# Patient Record
Sex: FEMALE | Race: WHITE | NOT HISPANIC OR LATINO | Employment: OTHER | URBAN - METROPOLITAN AREA
[De-identification: names, ages, dates, MRNs, and addresses within clinical notes are randomized per-mention and may not be internally consistent; named-entity substitution may affect disease eponyms.]

---

## 2017-01-05 ENCOUNTER — OFFICE VISIT (OUTPATIENT)
Dept: OTHER | Facility: IMAGING CENTER | Age: 77
End: 2017-01-05

## 2017-01-05 DIAGNOSIS — N83.8 OVARIAN MASS: ICD-10-CM

## 2017-01-05 DIAGNOSIS — J45.40 MODERATE PERSISTENT ASTHMA WITHOUT COMPLICATION: Primary | ICD-10-CM

## 2017-01-05 DIAGNOSIS — N81.10 BLADDER PROLAPSE, FEMALE, ACQUIRED: ICD-10-CM

## 2017-01-05 DIAGNOSIS — F41.9 ANXIETY: ICD-10-CM

## 2017-01-05 PROCEDURE — 97810 ACUP 1/> WO ESTIM 1ST 15 MIN: CPT | Performed by: FAMILY MEDICINE

## 2017-01-05 PROCEDURE — 99213 OFFICE O/P EST LOW 20 MIN: CPT | Mod: 25 | Performed by: FAMILY MEDICINE

## 2017-01-05 PROCEDURE — 97811 ACUP 1/> W/O ESTIM EA ADD 15: CPT | Performed by: FAMILY MEDICINE

## 2017-01-05 NOTE — MR AVS SNAPSHOT
Stephanie Guerrero   2017 3:30 PM   Office Visit   MRN: [id number]    Department:  Acupuncture Med   Dept Phone:  [phone]    Description:  Female : 1940   Provider:  Sushant Ferrera M.D.           Allergies as of 2017     Allergen Noted Reactions    Enalapril 2015   Rash           Vital Signs     Smoking Status                   Former Smoker           Basic Information     Date Of Birth Sex Race Ethnicity Preferred Language    1940 Female White Non- English      Problem List              ICD-10-CM Priority Class Noted - Resolved    Poliomyelitis, bulbar A80.30   Unknown - Present    Vitamin D deficiency E55.9   2015 - Present    Bladder prolapse, female, acquired N81.10   2015 - Present    Moderate persistent asthma without complication J45.40   2015 - Present    Anxiety F41.9   2016 - Present    IGT (impaired glucose tolerance) R73.02   10/14/2016 - Present    Ovarian mass N83.9   11/10/2016 - Present      Health Maintenance        Date Due Completion Dates    IMM DTaP/Tdap/Td Vaccine (1 - Tdap) 1959 ---    MAMMOGRAM 1980 ---    IMM INFLUENZA (1) 2016 ---    BONE DENSITY 2017    COLON CANCER SCREENING ANNUAL FIT 10/10/2017 10/10/2016            Current Immunizations     13-VALENT PCV PREVNAR 2016    Pneumococcal polysaccharide vaccine (PPSV-23) 10/11/2010    SHINGLES VACCINE 4/10/2012      Below and/or attached are the medications your provider expects you to take. Review all of your home medications and newly ordered medications with your provider and/or pharmacist. Follow medication instructions as directed by your provider and/or pharmacist. Please keep your medication list with you and share with your provider. Update the information when medications are discontinued, doses are changed, or new medications (including over-the-counter products) are added; and carry medication information at all times in the event of  emergency situations     Allergies:  ENALAPRIL - Rash               Medications  Valid as of: January 05, 2017 -  4:51 PM    Generic Name Brand Name Tablet Size Instructions for use    Albuterol Sulfate (Aero Soln) PROAIR  (90 BASE) MCG/ACT Inhale 2 Puffs by mouth every 6 hours as needed for Shortness of Breath.        Beclomethasone Dipropionate (Aero Soln) QVAR 40 MCG/ACT Inhale 1 Puff by mouth 2 Times a Day.        Cholecalciferol   Take 4,000 Units by mouth every day.        Coenzyme Q10   Take  by mouth.        Fluticasone Propionate (Suspension) FLONASE 50 MCG/ACT Spray 1 Spray in nose 2 times a day.        Loratadine   Take  by mouth.        LORazepam (Tab) ATIVAN 0.5 MG Take 0.5 Tabs by mouth every four hours as needed for Anxiety.        Omega-3 Fatty Acids   Take  by mouth.        Phosphatidylserine (Cap) Phosphatidylserine 100 MG Take  by mouth.        Probiotic Product   Take  by mouth.        Resveratrol (Cap) Resveratrol 250 MG Take  by mouth.        .                 Medicines prescribed today were sent to:     [facility] #[id number] - [place] - [address] [facility]    [address] [place] [address]    Phone: [phone] Fax: [phone]    Open 24 Hours?: No      Medication refill instructions:       If your prescription bottle indicates you have medication refills left, it is not necessary to call your provider’s office. Please contact your pharmacy and they will refill your medication.    If your prescription bottle indicates you do not have any refills left, you may request refills at any time through one of the following ways: The online ALTHIA system (except Urgent Care), by calling your provider’s office, or by asking your pharmacy to contact your provider’s office with a refill request. Medication refills are processed only during regular business hours and may not be available until the next business day. Your provider may request additional information or to have a follow-up visit  with you prior to refilling your medication.   *Please Note: Medication refills are assigned a new Rx number when refilled electronically. Your pharmacy may indicate that no refills were authorized even though a new prescription for the same medication is available at the pharmacy. Please request the medicine by name with the pharmacy before contacting your provider for a refill.        Other Notes About Your Plan     Patient is enrolled in [facility] with Dr. Mally Coles.           MyChart Access Code: Activation code not generated  Current Cree Status: Active

## 2017-01-05 NOTE — PROGRESS NOTES
[facility] Medical Acupuncture Progress Note  [address] [place] [address]  Dept: [phone]      Patient Name: Stephanie Guerrero   MRN: [id number]  YOB: 1940  PCP: Pcp Pt States None  Date of Service: 1/5/2017  3:44 PM    CC  Stephanie - asthma exacerbation, allergic rhinitis, impending surgery   HPI Last treatment at group acupuncture was helpful for her brain, but she was uncomfortable in group acupuncture chairs.  She has come back to private sessions    She has an upcoming surgery for a large ovarian cyst with Dr. Bearden.  She has no pain with this.    She is supposed to get a hysterectomy and cystectomy.  They want to fix the bladder prolapse at the same time.  She's worried about this surgery.  It will be lap.  She has a pre-op January 10th.      She did see the allergist about her breathing.  She is going to have allergy shots to build up her immunity.  She has a Bijon dog.  She's allergic to everything but dogs and feathers.      She did have medications given to her for her previous bladder prolapse surgery.  Tom Meyer.  Deboraadonna 200c Staph 1M, Arnica Montana 1M           ROS She likes to take supplements / vitamins  She's over due for a checkup .  Maintains DM with diet.    Favorite color - aqua - makes her happy.  .  Natural colors.  Summer - likes the sunshine.   Can get too hot for her - past 80s.    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  [place], [place], [place]  2 kids, 2 grandchildren.    Born: [place], [place].  Time -  12:37PM.    Likes to laugh. Likes games.   Lives alone - has dogs.  Did have dogs die on her in 2015  Second hand smoke exposure in the 1970  Had acupuncture in the past in [place], [place] - for an allergy rash  Likes to knit. Used to bowl   PMH Past Medical History   Diagnosis Date   • DM type 2 (diabetes mellitus, type 2) (HCC)      diet controlled   • MEDICAL HOME 4/19/2013   • Impacted ear wax 8/19/2013   • Sinusitis 10/7/2014   •  Bronchitis 10/7/2014   • Bulbar polio      age 16   • Asthma    • Elevated blood pressure 6/5/2015     Has been monitoring blood pressure at home, readings typically 97/70. 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 BP elevated in office today, she suspects this is secondary to office visit and some recent stress and will continue home monitoring. She limits sodium and is physically active.      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 30 Tab 0   • beclomethasone (QVAR) 40 MCG/ACT inhaler Inhale 1 Puff by mouth 2 Times a Day. 1 Inhaler 6   • PROAIR  (90 BASE) MCG/ACT Aero Soln inhalation aerosol Inhale 2 Puffs by mouth every 6 hours as needed for Shortness of Breath. 1 Inhaler 3   • Cholecalciferol (VITAMIN D-3 PO) Take 4,000 Units by mouth every day.     • Resveratrol 250 MG CAPS Take  by mouth.     • Phosphatidylserine 100 MG CAPS Take  by mouth.     • Coenzyme Q10  (CO Q 10 PO) Take  by mouth.     • Probiotic Product (PROBIOTIC PO) Take  by mouth.     • Omega-3 Fatty Acids (OMEGA 3 PO) Take  by mouth.     • fluticasone (FLONASE) 50 MCG/ACT nasal spray Spray 1 Spray in nose 2 times a day. (Patient not taking: Reported on 9/21/2016) 1 Bottle 3   • Loratadine (CLARITIN PO) Take  by mouth.       No current facility-administered medications on file prior to visit.      ALLERGIES Allergies   Allergen Reactions   • Enalapril Rash            PE Pulses - not examined today  Tongue - not examined today     Assesment [place], weak body  Stagnation GEOVANNA/LI Qi, Wood    Western Encounter Diagnoses   Name Primary?   • Moderate persistent asthma without complication Yes   • Anxiety    • Bladder prolapse, female, acquired    • Ovarian mass                   Plan Set 1: LLE Yin - Jue Yin Cesar Chavez 3:6  Set 2: UE Dennis Yin Chavez Darryl 3:6  Set 3: Ear Chávez Men Yin Pascual GV20 Kristel Chávez Spencer  Treatment Matrix - L Yin / R Chavez  Let her borrow a book: How to Prepare for Surgery  Discussed the correct use of Tom Dunn and homeopathic medications.  I will get back to her about the correct homeopathics to take.  Emphasized the use of exercise and good diet for preconditioning and recovery   15 min spent face to face, not including procedure      Sushant Ferrera M.D.

## 2017-01-06 NOTE — PATIENT INSTRUCTIONS
The side effects of Acupuncture needle insertion include: minor bruising, bleeding, or pain at the site of needle insertion.  If more worrisome symptoms, such as continued bleeding, severe bruising, or continue pain or altered sensation persist, please contact [facility] office @ [phone]

## 2017-01-23 ENCOUNTER — HOSPITAL ENCOUNTER (OUTPATIENT)
Dept: RADIOLOGY | Facility: MEDICAL CENTER | Age: 77
End: 2017-01-23
Attending: SPECIALIST | Admitting: SPECIALIST
Payer: MEDICARE

## 2017-01-23 DIAGNOSIS — Z01.811 PRE-OPERATIVE RESPIRATORY EXAMINATION: ICD-10-CM

## 2017-01-23 DIAGNOSIS — Z01.810 PRE-OPERATIVE CARDIOVASCULAR EXAMINATION: ICD-10-CM

## 2017-01-23 DIAGNOSIS — Z01.812 PRE-OPERATIVE LABORATORY EXAMINATION: ICD-10-CM

## 2017-01-23 LAB
ABO GROUP BLD: NORMAL
ALBUMIN SERPL BCP-MCNC: 4.9 G/DL (ref 3.2–4.9)
ALBUMIN/GLOB SERPL: 1.5 G/DL
ALP SERPL-CCNC: 111 U/L (ref 30–99)
ALT SERPL-CCNC: 15 U/L (ref 2–50)
ANION GAP SERPL CALC-SCNC: 6 MMOL/L (ref 0–11.9)
APTT PPP: 28 SEC (ref 24.7–36)
AST SERPL-CCNC: 21 U/L (ref 12–45)
BILIRUB SERPL-MCNC: 0.5 MG/DL (ref 0.1–1.5)
BLD GP AB SCN SERPL QL: NORMAL
BUN SERPL-MCNC: 20 MG/DL (ref 8–22)
CALCIUM SERPL-MCNC: 10.4 MG/DL (ref 8.5–10.5)
CHLORIDE SERPL-SCNC: 98 MMOL/L (ref 96–112)
CO2 SERPL-SCNC: 32 MMOL/L (ref 20–33)
CREAT SERPL-MCNC: 0.84 MG/DL (ref 0.5–1.4)
EKG IMPRESSION: NORMAL
ERYTHROCYTE [DISTWIDTH] IN BLOOD BY AUTOMATED COUNT: 48.3 FL (ref 35.9–50)
GFR SERPL CREATININE-BSD FRML MDRD: >60 ML/MIN/1.73 M 2
GLOBULIN SER CALC-MCNC: 3.3 G/DL (ref 1.9–3.5)
GLUCOSE SERPL-MCNC: 99 MG/DL (ref 65–99)
HCT VFR BLD AUTO: 47.1 % (ref 37–47)
HGB BLD-MCNC: 14.9 G/DL (ref 12–16)
INR PPP: 0.89 (ref 0.87–1.13)
MCH RBC QN AUTO: 30.3 PG (ref 27–33)
MCHC RBC AUTO-ENTMCNC: 31.6 G/DL (ref 33.6–35)
MCV RBC AUTO: 95.7 FL (ref 81.4–97.8)
PLATELET # BLD AUTO: 294 K/UL (ref 164–446)
PMV BLD AUTO: 9.9 FL (ref 9–12.9)
POTASSIUM SERPL-SCNC: 5 MMOL/L (ref 3.6–5.5)
PROT SERPL-MCNC: 8.2 G/DL (ref 6–8.2)
PROTHROMBIN TIME: 12.3 SEC (ref 12–14.6)
RBC # BLD AUTO: 4.92 M/UL (ref 4.2–5.4)
RH BLD: NORMAL
SODIUM SERPL-SCNC: 136 MMOL/L (ref 135–145)
WBC # BLD AUTO: 8.3 K/UL (ref 4.8–10.8)

## 2017-01-23 PROCEDURE — 36415 COLL VENOUS BLD VENIPUNCTURE: CPT

## 2017-01-23 PROCEDURE — 85610 PROTHROMBIN TIME: CPT

## 2017-01-23 PROCEDURE — 86901 BLOOD TYPING SEROLOGIC RH(D): CPT

## 2017-01-23 PROCEDURE — 71020 DX-CHEST-2 VIEWS: CPT

## 2017-01-23 PROCEDURE — 85027 COMPLETE CBC AUTOMATED: CPT

## 2017-01-23 PROCEDURE — 80053 COMPREHEN METABOLIC PANEL: CPT

## 2017-01-23 PROCEDURE — 86900 BLOOD TYPING SEROLOGIC ABO: CPT

## 2017-01-23 PROCEDURE — 86850 RBC ANTIBODY SCREEN: CPT

## 2017-01-23 PROCEDURE — 85730 THROMBOPLASTIN TIME PARTIAL: CPT

## 2017-01-26 ENCOUNTER — HOSPITAL ENCOUNTER (OUTPATIENT)
Facility: MEDICAL CENTER | Age: 77
End: 2017-01-26
Attending: SPECIALIST | Admitting: SPECIALIST
Payer: MEDICARE

## 2017-01-26 VITALS
OXYGEN SATURATION: 93 % | WEIGHT: 119.05 LBS | TEMPERATURE: 97.5 F | HEART RATE: 90 BPM | BODY MASS INDEX: 22.48 KG/M2 | HEIGHT: 61 IN | RESPIRATION RATE: 14 BRPM | SYSTOLIC BLOOD PRESSURE: 139 MMHG | DIASTOLIC BLOOD PRESSURE: 90 MMHG

## 2017-01-26 PROBLEM — N81.10 UNSPECIFIED PROLAPSE OF VAGINAL WALLS: Status: ACTIVE | Noted: 2017-01-26

## 2017-01-26 LAB
ABO GROUP BLD: NORMAL
ANION GAP SERPL CALC-SCNC: 13 MMOL/L (ref 0–11.9)
BASE EXCESS BLDA CALC-SCNC: -10 MMOL/L (ref -4–3)
BASE EXCESS BLDA CALC-SCNC: -6 MMOL/L (ref -4–3)
BODY TEMPERATURE: 36.3 CENTIGRADE
BODY TEMPERATURE: ABNORMAL CENTIGRADE
BUN SERPL-MCNC: 14 MG/DL (ref 8–22)
CALCIUM SERPL-MCNC: 9.8 MG/DL (ref 8.5–10.5)
CHLORIDE SERPL-SCNC: 101 MMOL/L (ref 96–112)
CO2 SERPL-SCNC: 25 MMOL/L (ref 20–33)
CREAT SERPL-MCNC: 0.7 MG/DL (ref 0.5–1.4)
GFR SERPL CREATININE-BSD FRML MDRD: >60 ML/MIN/1.73 M 2
GLUCOSE BLD-MCNC: 107 MG/DL (ref 65–99)
GLUCOSE SERPL-MCNC: 64 MG/DL (ref 65–99)
HCO3 BLDA-SCNC: 20 MMOL/L (ref 17–25)
HCO3 BLDA-SCNC: 22 MMOL/L (ref 17–25)
INHALED O2 FLOW RATE: 5 L/MIN (ref 2–10)
PCO2 BLDA: 41.1 MMHG (ref 26–37)
PCO2 BLDA: 82 MMHG (ref 26–37)
PCO2 TEMP ADJ BLDA: 79.5 MMHG (ref 26–37)
PH BLDA: 7.05 [PH] (ref 7.4–7.5)
PH BLDA: 7.31 [PH] (ref 7.4–7.5)
PH TEMP ADJ BLDA: 7.06 [PH] (ref 7.4–7.5)
PO2 BLDA: 133.8 MMHG (ref 64–87)
PO2 BLDA: 225.8 MMHG (ref 64–87)
PO2 TEMP ADJ BLDA: 222.4 MMHG (ref 64–87)
POTASSIUM SERPL-SCNC: 4.1 MMOL/L (ref 3.6–5.5)
SAO2 % BLDA: 98 % (ref 93–99)
SAO2 % BLDA: 99 % (ref 93–99)
SODIUM SERPL-SCNC: 139 MMOL/L (ref 135–145)

## 2017-01-26 PROCEDURE — 82803 BLOOD GASES ANY COMBINATION: CPT

## 2017-01-26 PROCEDURE — 700111 HCHG RX REV CODE 636 W/ 250 OVERRIDE (IP)

## 2017-01-26 PROCEDURE — 82962 GLUCOSE BLOOD TEST: CPT

## 2017-01-26 PROCEDURE — 160046 HCHG PACU - 1ST 60 MINS PHASE II: Performed by: SPECIALIST

## 2017-01-26 PROCEDURE — 500854 HCHG NEEDLE, INSUFFLATION FOR STEP: Performed by: SPECIALIST

## 2017-01-26 PROCEDURE — 88307 TISSUE EXAM BY PATHOLOGIST: CPT

## 2017-01-26 PROCEDURE — 502714 HCHG ROBOTIC SURGERY SERVICES: Performed by: SPECIALIST

## 2017-01-26 PROCEDURE — 700102 HCHG RX REV CODE 250 W/ 637 OVERRIDE(OP)

## 2017-01-26 PROCEDURE — 160048 HCHG OR STATISTICAL LEVEL 1-5: Performed by: SPECIALIST

## 2017-01-26 PROCEDURE — 500123 HCHG BOVIE, CONTROL W/BLADE: Performed by: SPECIALIST

## 2017-01-26 PROCEDURE — 160002 HCHG RECOVERY MINUTES (STAT): Performed by: SPECIALIST

## 2017-01-26 PROCEDURE — 94640 AIRWAY INHALATION TREATMENT: CPT

## 2017-01-26 PROCEDURE — 88305 TISSUE EXAM BY PATHOLOGIST: CPT

## 2017-01-26 PROCEDURE — 501399 HCHG SPECIMAN BAG, ENDO CATC: Performed by: SPECIALIST

## 2017-01-26 PROCEDURE — 160042 HCHG SURGERY MINUTES - EA ADDL 1 MIN LEVEL 5: Performed by: SPECIALIST

## 2017-01-26 PROCEDURE — 501582 HCHG TROCAR, THRD BLADED: Performed by: SPECIALIST

## 2017-01-26 PROCEDURE — 80048 BASIC METABOLIC PNL TOTAL CA: CPT

## 2017-01-26 PROCEDURE — 700101 HCHG RX REV CODE 250

## 2017-01-26 PROCEDURE — 160035 HCHG PACU - 1ST 60 MINS PHASE I: Performed by: SPECIALIST

## 2017-01-26 PROCEDURE — 501664 HCHG TUBING, FILTER STRYKER: Performed by: SPECIALIST

## 2017-01-26 PROCEDURE — 160009 HCHG ANES TIME/MIN: Performed by: SPECIALIST

## 2017-01-26 PROCEDURE — 160025 RECOVERY II MINUTES (STATS): Performed by: SPECIALIST

## 2017-01-26 PROCEDURE — 160036 HCHG PACU - EA ADDL 30 MINS PHASE I: Performed by: SPECIALIST

## 2017-01-26 PROCEDURE — A4606 OXYGEN PROBE USED W OXIMETER: HCPCS | Performed by: SPECIALIST

## 2017-01-26 PROCEDURE — 88302 TISSUE EXAM BY PATHOLOGIST: CPT

## 2017-01-26 PROCEDURE — 501838 HCHG SUTURE GENERAL: Performed by: SPECIALIST

## 2017-01-26 PROCEDURE — 110382 HCHG SHELL REV 271: Performed by: SPECIALIST

## 2017-01-26 PROCEDURE — 500025 HCHG ARMREST, CRADLE FOAM: Performed by: SPECIALIST

## 2017-01-26 PROCEDURE — 502240 HCHG MISC OR SUPPLY RC 0272: Performed by: SPECIALIST

## 2017-01-26 PROCEDURE — 110371 HCHG SHELL REV 272: Performed by: SPECIALIST

## 2017-01-26 PROCEDURE — 160047 HCHG PACU  - EA ADDL 30 MINS PHASE II: Performed by: SPECIALIST

## 2017-01-26 PROCEDURE — 501411 HCHG SPONGE, BABY LAP W/O RINGS: Performed by: SPECIALIST

## 2017-01-26 PROCEDURE — 160031 HCHG SURGERY MINUTES - 1ST 30 MINS LEVEL 5: Performed by: SPECIALIST

## 2017-01-26 RX ORDER — NALOXONE HYDROCHLORIDE 0.4 MG/ML
INJECTION, SOLUTION INTRAMUSCULAR; INTRAVENOUS; SUBCUTANEOUS
Status: COMPLETED
Start: 2017-01-26 | End: 2017-01-26

## 2017-01-26 RX ORDER — BUPIVACAINE HYDROCHLORIDE AND EPINEPHRINE 2.5; 5 MG/ML; UG/ML
INJECTION, SOLUTION EPIDURAL; INFILTRATION; INTRACAUDAL; PERINEURAL
Status: DISCONTINUED | OUTPATIENT
Start: 2017-01-26 | End: 2017-01-26 | Stop reason: HOSPADM

## 2017-01-26 RX ORDER — MORPHINE SULFATE 4 MG/ML
2 INJECTION, SOLUTION INTRAMUSCULAR; INTRAVENOUS
Status: DISCONTINUED | OUTPATIENT
Start: 2017-01-26 | End: 2017-01-26 | Stop reason: HOSPADM

## 2017-01-26 RX ORDER — SODIUM CHLORIDE, SODIUM LACTATE, POTASSIUM CHLORIDE, CALCIUM CHLORIDE 600; 310; 30; 20 MG/100ML; MG/100ML; MG/100ML; MG/100ML
1000 INJECTION, SOLUTION INTRAVENOUS
Status: COMPLETED | OUTPATIENT
Start: 2017-01-26 | End: 2017-01-26

## 2017-01-26 RX ORDER — MONTELUKAST SODIUM 10 MG/1
10 TABLET ORAL DAILY
COMMUNITY
End: 2018-06-09

## 2017-01-26 RX ORDER — COVID-19 ANTIGEN TEST
1 KIT MISCELLANEOUS
COMMUNITY
End: 2018-06-09

## 2017-01-26 RX ORDER — METOCLOPRAMIDE 10 MG/1
10 TABLET ORAL EVERY 6 HOURS
Status: DISCONTINUED | OUTPATIENT
Start: 2017-01-26 | End: 2017-01-26 | Stop reason: HOSPADM

## 2017-01-26 RX ORDER — LIDOCAINE HYDROCHLORIDE 10 MG/ML
INJECTION, SOLUTION INFILTRATION; PERINEURAL
Status: COMPLETED
Start: 2017-01-26 | End: 2017-01-26

## 2017-01-26 RX ORDER — ONDANSETRON 4 MG/1
4 TABLET, FILM COATED ORAL EVERY 6 HOURS PRN
Qty: 30 TAB | Refills: 0 | Status: SHIPPED | OUTPATIENT
Start: 2017-01-26 | End: 2018-03-14

## 2017-01-26 RX ORDER — ONDANSETRON 2 MG/ML
4 INJECTION INTRAMUSCULAR; INTRAVENOUS EVERY 6 HOURS PRN
Status: CANCELLED | OUTPATIENT
Start: 2017-01-26

## 2017-01-26 RX ORDER — OXYCODONE HYDROCHLORIDE AND ACETAMINOPHEN 5; 325 MG/1; MG/1
1 TABLET ORAL EVERY 4 HOURS PRN
Qty: 60 TAB | Refills: 0 | Status: SHIPPED | OUTPATIENT
Start: 2017-01-26 | End: 2017-02-14

## 2017-01-26 RX ORDER — KETOROLAC TROMETHAMINE 30 MG/ML
15 INJECTION, SOLUTION INTRAMUSCULAR; INTRAVENOUS EVERY 6 HOURS PRN
Status: DISCONTINUED | OUTPATIENT
Start: 2017-01-26 | End: 2017-01-26 | Stop reason: HOSPADM

## 2017-01-26 RX ORDER — MAGNESIUM HYDROXIDE 1200 MG/15ML
LIQUID ORAL
Status: DISCONTINUED | OUTPATIENT
Start: 2017-01-26 | End: 2017-01-26 | Stop reason: HOSPADM

## 2017-01-26 RX ORDER — FAMOTIDINE 20 MG/1
20 TABLET, FILM COATED ORAL 2 TIMES DAILY
Status: CANCELLED | OUTPATIENT
Start: 2017-01-26

## 2017-01-26 RX ORDER — METOCLOPRAMIDE HYDROCHLORIDE 5 MG/ML
10 INJECTION INTRAMUSCULAR; INTRAVENOUS EVERY 6 HOURS
Status: DISCONTINUED | OUTPATIENT
Start: 2017-01-26 | End: 2017-01-26 | Stop reason: HOSPADM

## 2017-01-26 RX ORDER — METOCLOPRAMIDE 10 MG/1
10 TABLET ORAL EVERY 6 HOURS PRN
Qty: 30 TAB | Refills: 0 | Status: SHIPPED | OUTPATIENT
Start: 2017-01-26 | End: 2018-06-09

## 2017-01-26 RX ORDER — OXYCODONE HYDROCHLORIDE AND ACETAMINOPHEN 5; 325 MG/1; MG/1
1 TABLET ORAL EVERY 4 HOURS PRN
Status: DISCONTINUED | OUTPATIENT
Start: 2017-01-26 | End: 2017-01-26 | Stop reason: HOSPADM

## 2017-01-26 RX ORDER — DEXTROSE MONOHYDRATE, SODIUM CHLORIDE, AND POTASSIUM CHLORIDE 50; 1.49; 4.5 G/1000ML; G/1000ML; G/1000ML
INJECTION, SOLUTION INTRAVENOUS CONTINUOUS
Status: CANCELLED | OUTPATIENT
Start: 2017-01-26

## 2017-01-26 RX ORDER — SODIUM CHLORIDE 9 MG/ML
500 INJECTION, SOLUTION INTRAVENOUS PRN
Status: CANCELLED | OUTPATIENT
Start: 2017-01-26

## 2017-01-26 RX ADMIN — LIDOCAINE HYDROCHLORIDE: 10 INJECTION, SOLUTION INFILTRATION; PERINEURAL at 06:55

## 2017-01-26 RX ADMIN — NALOXONE HYDROCHLORIDE 0.1 MG: 0.4 INJECTION, SOLUTION INTRAMUSCULAR; INTRAVENOUS; SUBCUTANEOUS at 12:25

## 2017-01-26 RX ADMIN — SODIUM CHLORIDE, SODIUM LACTATE, POTASSIUM CHLORIDE, CALCIUM CHLORIDE 1000 ML: 600; 310; 30; 20 INJECTION, SOLUTION INTRAVENOUS at 07:05

## 2017-01-26 RX ADMIN — NALOXONE HYDROCHLORIDE 0.1 MG: 0.4 INJECTION, SOLUTION INTRAMUSCULAR; INTRAVENOUS; SUBCUTANEOUS at 12:15

## 2017-01-26 ASSESSMENT — PAIN SCALES - GENERAL
PAINLEVEL_OUTOF10: 0
PAINLEVEL_OUTOF10: 1
PAINLEVEL_OUTOF10: 0
PAINLEVEL_OUTOF10: 1
PAINLEVEL_OUTOF10: 0
PAINLEVEL_OUTOF10: 1
PAINLEVEL_OUTOF10: 0
PAINLEVEL_OUTOF10: 1
PAINLEVEL_OUTOF10: 0

## 2017-01-26 ASSESSMENT — COPD QUESTIONNAIRES
COPD SCREENING SCORE: 3
DO YOU EVER COUGH UP ANY MUCUS OR PHLEGM?: YES, A FEW DAYS A WEEK OR MONTH
DURING THE PAST 4 WEEKS HOW MUCH DID YOU FEEL SHORT OF BREATH: NONE/LITTLE OF THE TIME
HAVE YOU SMOKED AT LEAST 100 CIGARETTES IN YOUR ENTIRE LIFE: NO/DON'T KNOW

## 2017-01-26 ASSESSMENT — LIFESTYLE VARIABLES: EVER_SMOKED: NEVER

## 2017-01-26 NOTE — IP AVS SNAPSHOT
1/26/2017          Stephanie Guerrero  [address]  [place] [address]    Dear Stephanie:    [facility] wants to ensure your discharge home is safe and you or your loved ones have had all your questions answered regarding your care after you leave the hospital.    You may receive a telephone call within two days of your discharge.  This call is to make certain you understand your discharge instructions as well as ensure we provided you with the best care possible during your stay with us.     The call will only last approximately 3-5 minutes and will be done by a nurse.    Once again, we want to ensure your discharge home is safe and that you have a clear understanding of any next steps in your care.  If you have any questions or concerns, please do not hesitate to contact us, we are here for you.  Thank you for choosing [facility] for your healthcare needs.    Sincerely,    Samy Dutton    [facility]

## 2017-01-26 NOTE — PROGRESS NOTES
1135- pt brought to PACU at 1049.  Unable to wake pt up, pt not responding to painful stimuli.  Dr. Gonzalez notified, orders to get ABG, and call with results.     Lab called with critical value:  Ph 7.06, PCO2 79.5.  Dr. Gonzalez notified.  Orders to repeat Narcan and get new lab for ABG in 30 min. Pt able to open eyes and squeeze fingers on command, but then falls right back asleep.   On 5L mask with sats 98%,  VSS.    1243-  Pt awake and following commands.  Denies pain.  VSS.  Second ABG just drawn.

## 2017-01-26 NOTE — OR SURGEON
Immediate Post-Operative Note      PreOp Diagnosis: Robotic BSO, right ureterolysis, GRADE 3 CYSTOCELE AND GRADE 2 RECTOCELE    PostOp Diagnosis: SAME    Procedure(s):   ROBOTIC XI BSO, RIGHT URETEROLYSIS  - Wound Class: Clean Contaminated  ANTERIOR REPAIR FOR ANTERIOR COLPORRHAPHY, POSTERIOR REPAIR - Wound Class: Clean Contaminated    Surgeon(s):  Liborio Bearden M.D.    Anesthesiologist/Type of Anesthesia:  Anesthesiologist: Dane Gonzalez D.O./General    Surgical Staff:  Assistant: JOSH Alfredo  Circulator: Terry Palencia R.N.; Sofia Paez R.N.  Scrub Person: Marah Lake; Anna Son; Ginette Ayala    Specimen: ovaries    Estimated Blood Loss: 50 cc    Findings: enlarged right cystic ovary benign, previous abdominal sacral colpopexy  Mesh is noted, grade 3 cystocele and grade 2 rectocele    Complications: none        1/26/2017 3:47 PM Liborio Bearden

## 2017-01-26 NOTE — IP AVS SNAPSHOT
" After Visit Summary                                                                                                                Name:Stephanie Guerrero  Medical Record Number:[id number]  CSN: [id number]    YOB: 1940   Age: 76 y.o.  Sex: female  HT:1.549 m (5' 1\") WT: 54 kg (119 lb 0.8 oz)          Admit Date: 2017     Discharge Date:   Today's Date: 2017  Attending Doctor:  Liborio Bearden M.D.                  Allergies:  Enalapril                Discharge Instructions         ACTIVITY: Rest and take it easy for the first 24 hours.  A responsible adult is recommended to remain with you during that time.  It is normal to feel sleepy.  We encourage you to not do anything that requires balance, judgment or coordination.    MILD FLU-LIKE SYMPTOMS ARE NORMAL. YOU MAY EXPERIENCE GENERALIZED MUSCLE ACHES, THROAT IRRITATION, HEADACHE AND/OR SOME NAUSEA.    FOR 24 HOURS DO NOT:  Drive, operate machinery or run household appliances.  Drink beer or alcoholic beverages.   Make important decisions or sign legal documents.    SPECIAL INSTRUCTIONS: See Below.    DIET: To avoid nausea, slowly advance diet as tolerated, avoiding spicy or greasy foods for the first day.  Add more substantial food to your diet according to your physician's instructions.  Babies can be fed formula or breast milk as soon as they are hungry.  INCREASE FLUIDS AND FIBER TO AVOID CONSTIPATION.    SURGICAL DRESSING/BATHIN. No heavy lifting greater than 10 pounds for minimum 6 weeks  2. Nothing in vagina (ie no tampons, douching, intercourse) for minimum of 6 weeks  3. No driving while taking narcotics   4. Return to our office as directed and call to confirm appointment  Call our office [phone] if you develop any fevers, chills, nausea/vomiting, heavy vaginal bleeding, or redness, tenderness, and/or drainage from your wound, if you have persistent watery discharge while ambulating or stool draining from the vagina .  5. " Showering is ok after shower make sure wound is dry.   6. You may keep the wound dressing and change everyday. After 2 weeks from surgery you may keep the wound dressing off.   7. You may have vaginal spotting or light bleeding which is normal.  8. If you have not had a bowel movement for 2 days, please take over the counter Milk of Magnesium, 1 tablespoon every 4 hours. After 4 doses and if you still have not had a bowel movement, please call your doctor.   9. You may eat soft diet, such as soup, liquid, for day #1 and if tolerating you may resume your regular diet.  10. Pt to d/c home with Painting catheter and call to get catheter removal on Tuesday 1/31      Painting Catheter Care, Adult  A Painting catheter is a soft, flexible tube. This tube is placed into your bladder to drain pee (urine). If you go home with this catheter in place, follow the instructions below.  TAKING CARE OF THE CATHETER  1. Wash your hands with soap and water.  2. Put soap and water on a clean washcloth.  ¨ Clean the skin where the tube goes into your body.  § Clean away from the tube site.  § Never wipe toward the tube.  § Clean the area using a circular motion.  ¨ Remove all the soap. Pat the area dry with a clean towel. For males, reposition the skin that covers the end of the penis (foreskin).  3. Attach the tube to your leg with tape or a leg strap. Do not stretch the tube tight. If you are using tape, remove any stickiness left behind by past tape you used.  4. Keep the drainage bag below your hips. Keep it off the floor.  5. Check your tube during the day. Make sure it is working and draining. Make sure the tube does not curl, twist, or bend.  6. Do not pull on the tube or try to take it out.  TAKING CARE OF THE DRAINAGE BAGS  You will have a large overnight drainage bag and a small leg bag. You may wear the overnight bag any time. Never wear the small bag at night. Follow the directions below.  Emptying the Drainage Bag  Empty your  drainage bag when it is  -½ full or at least 2-3 times a day.  1. Wash your hands with soap and water.  2. Keep the drainage bag below your hips.  3. Hold the dirty bag over the toilet or clean container.  4. Open the pour spout at the bottom of the bag. Empty the pee into the toilet or container. Do not let the pour spout touch anything.  5. Clean the pour spout with a gauze pad or cotton ball that has rubbing alcohol on it.  6. Close the pour spout.  7. Attach the bag to your leg with tape or a leg strap.  8. Wash your hands well.  Changing the Drainage Bag  Change your bag once a month or sooner if it starts to smell or look dirty.   1. Wash your hands with soap and water.  2. Pinch the rubber tube so that pee does not spill out.  3. Disconnect the catheter tube from the drainage tube at the connection valve. Do not let the tubes touch anything.  4. Clean the end of the catheter tube with an alcohol wipe. Clean the end of a the drainage tube with a different alcohol wipe.  5. Connect the catheter tube to the drainage tube of the clean drainage bag.  6. Attach the new bag to the leg with tape or a leg strap. Avoid attaching the new bag too tightly.  7. Wash your hands well.  Cleaning the Drainage Bag  1. Wash your hands with soap and water.  2. Wash the bag in warm, soapy water.  3. Rinse the bag with warm water.  4. Fill the bag with a mixture of white vinegar and water (1 cup vinegar to 1 quart warm water [.2 liter vinegar to 1 liter warm water]). Close the bag and soak it for 30 minutes in the solution.  5. Rinse the bag with warm water.  6. Hang the bag to dry with the pour spout open and hanging downward.  7. Store the clean bag (once it is dry) in a clean plastic bag.  8. Wash your hands well.  PREVENT INFECTION  · Wash your hands before and after touching your tube.  · Take showers every day. Wash the skin where the tube enters your body. Do not take baths. Replace wet leg straps with dry ones, if this  applies.  · Do not use powders, sprays, or lotions on the genital area. Only use creams, lotions, or ointments as told by your doctor.  · For females, wipe from front to back after going to the bathroom.  · Drink enough fluids to keep your pee clear or pale yellow unless you are told not to have too much fluid (fluid restriction).  · Do not let the drainage bag or tubing touch or lie on the floor.  · Wear cotton underwear to keep the area dry.  GET HELP IF:  · Your pee is cloudy or smells unusually bad.  · Your tube becomes clogged.  · You are not draining pee into the bag or your bladder feels full.  · Your tube starts to leak.  GET HELP RIGHT AWAY IF:  · You have pain, puffiness (swelling), redness, or yellowish-white fluid (pus) where the tube enters the body.  · You have pain in the belly (abdomen), legs, lower back, or bladder.  · You have a fever.  · You see blood fill the tube, or your pee is pink or red.  · You feel sick to your stomach (nauseous), throw up (vomit), or have chills.  · Your tube gets pulled out.  MAKE SURE YOU:   · Understand these instructions.  · Will watch your condition.  · Will get help right away if you are not doing well or get worse.     This information is not intended to replace advice given to you by your health care provider. Make sure you discuss any questions you have with your health care provider.     Document Released: 04/14/2014 Document Revised: 01/08/2016 Document Reviewed: 04/14/2014  [employer] Interactive Patient Education ©2016 [employer]        FOLLOW-UP APPOINTMENT:  A follow-up appointment should be arranged with your doctor on 01/31/2017 for catheter removal, call to schedule.    You should CALL YOUR PHYSICIAN if you develop:  Fever greater than 101 degrees F.  Pain not relieved by medication, or persistent nausea or vomiting.  Excessive bleeding (blood soaking through dressing) or unexpected drainage from the wound.  Extreme redness or swelling around the  incision site, drainage of pus or foul smelling drainage.  Inability to urinate or empty your bladder within 8 hours.  Problems with breathing or chest pain.    You should call 911 if you develop problems with breathing or chest pain.  If you are unable to contact your doctor or surgical center, you should go to the nearest emergency room or urgent care center.  Physician's telephone #: [phone].    If any questions arise, call your doctor.  If your doctor is not available, please feel free to call the Surgical Center at [phone].  The Center is open Monday through Friday from 7AM to 7PM.  You can also call the HEALTH HOTLINE open 24 hours/day, 7 days/week and speak to a nurse at [phone], or toll free at [phone].    A registered nurse may call you a few days after your surgery to see how you are doing after your procedure.    MEDICATIONS: Resume taking daily medication.  Take prescribed pain medication with food.  If no medication is prescribed, you may take non-aspirin pain medication if needed.  PAIN MEDICATION CAN BE VERY CONSTIPATING.  Take a stool softener or laxative such as senokot, pericolace, or milk of magnesia if needed.    Prescription given for Percocet, zofran, reglan. No oral pain medications given.     If your physician has prescribed pain medication that includes Acetaminophen (Tylenol), do not take additional Acetaminophen (Tylenol) while taking the prescribed medication.    Depression / Suicide Risk    As you are discharged from this [facility] facility, it is important to learn how to keep safe from harming yourself.    Recognize the warning signs:  · Abrupt changes in personality, positive or negative- including increase in energy   · Giving away possessions  · Change in eating patterns- significant weight changes-  positive or negative  · Change in sleeping patterns- unable to sleep or sleeping all the time   · Unwillingness or inability to  communicate  · Depression  · Unusual sadness, discouragement and loneliness  · Talk of wanting to die  · Neglect of personal appearance   · Rebelliousness- reckless behavior  · Withdrawal from people/activities they love  · Confusion- inability to concentrate     If you or a loved one observes any of these behaviors or has concerns about self-harm, here's what you can do:  · Talk about it- your feelings and reasons for harming yourself  · Remove any means that you might use to hurt yourself (examples: pills, rope, extension cords, firearm)  · Get professional help from the community (Mental Health, Substance Abuse, psychological counseling)  · Do not be alone:Call your Safe Contact- someone whom you trust who will be there for you.  · Call your local CRISIS HOTLINE 293-4535 or 735-360-1503  · Call your local Children's Mobile Crisis Response Team [place] (486) 339-5045 or www.MobiWork  · Call the toll free National Suicide Prevention Hotlines   · National Suicide Prevention Lifeline 509-044-DBIQ (1559)  · National Hope Line Network 800-SUICIDE (393-5673)       Medication List      START taking these medications        Instructions    metoclopramide 10 MG Tabs   Commonly known as:  REGLAN    Take 1 Tab by mouth every 6 hours as needed.   Dose:  10 mg       ondansetron 4 MG Tabs tablet   Commonly known as:  ZOFRAN    Take 1 Tab by mouth every 6 hours as needed.   Dose:  4 mg       oxycodone-acetaminophen 5-325 MG Tabs   Commonly known as:  PERCOCET    Take 1 Tab by mouth every four hours as needed.   Dose:  1 Tab         CONTINUE taking these medications        Instructions    ALEVE 220 MG Caps   Generic drug:  Naproxen Sodium    Take 1 Cap by mouth 1 time daily as needed.   Dose:  1 Cap       Chromium 1 MG Caps    Take 1 Cap by mouth every day.   Dose:  1 Cap       CLARITIN PO    Take 1 Tab by mouth every day.   Dose:  1 Tab       CO Q 10 PO    Take 1 Tab by mouth every day.   Dose:  1 Tab        ECHINACEA GOLDENSEAL PLUS PO    Take 1 Each by mouth 1 time daily as needed. tincture   Dose:  1 Each       L-GLUTAMINE PO    Take 1 Tab by mouth every day.   Dose:  1 Tab       montelukast 10 MG Tabs   Commonly known as:  SINGULAIR    Take 10 mg by mouth every day.   Dose:  10 mg       multivitamin Tabs    Take 1 Tab by mouth every day.   Dose:  1 Tab       OMEGA 3 PO    Take 1 Tab by mouth every day.   Dose:  1 Tab       GIAN D ARCO PO    Take 1 Each by mouth 3 times a day. Tincture   Dose:  1 Each       Potassium 99 MG Tabs    Take 1 Tab by mouth every day.   Dose:  1 Tab       PROAIR  (90 BASE) MCG/ACT Aers inhalation aerosol   Generic drug:  albuterol    Inhale 2 Puffs by mouth every 6 hours as needed for Shortness of Breath.   Dose:  2 Puff       PROBIOTIC PO    Take 1 Tab by mouth every day.   Dose:  1 Tab       VITAMIN D-3 PO    Take 4,000 Units by mouth every day.   Dose:  4000 Units               Medication Information     Above and/or attached are the medications your physician expects you to take upon discharge. Review all of your home medications and newly ordered medications with your doctor and/or pharmacist. Follow medication instructions as directed by your doctor and/or pharmacist. Please keep your medication list with you and share with your physician. Update the information when medications are discontinued, doses are changed, or new medications (including over-the-counter products) are added; and carry medication information at all times in the event of emergency situations.        Resources     Quit Smoking / Tobacco Use:    I understand the use of any tobacco products increases my chance of suffering from future heart disease or stroke and could cause other illnesses which may shorten my life. Quitting the use of tobacco products is the single most important thing I can do to improve my health. For further information on smoking / tobacco cessation call a Toll Free Quit Line at  1-253.310.1367 (*National [employer] [place]) or 1-456.774.2763 (American Lung Association) or you can access the web based program at www.lungusa.org.    [place] Tobacco Users Help Line:  (418) 173-5690       Toll Free: 5-009-316-8619  Quit Tobacco Program [facility] (112)663-5867    Crisis Hotline:    [place] Crisis Hotline:  4-689-UEULNTX or 1-640.223.1193    [place] Crisis Hotline:    1-649.324.2597 or 043-874-5155    Discharge Survey:   Thank you for choosing [facility]. We hope we did everything we could to make your hospital stay a pleasant one. You may be receiving a survey and we would appreciate your time and participation in answering the questions. Your input is very valuable to us in our efforts to improve our service to our patients and their families.            Signatures     My signature on this form indicates that:    1. I acknowledge receipt and understanding of these Home Care Instruction.  2. My questions regarding this information have been answered to my satisfaction.  3. I have formulated a plan with my discharge nurse to obtain my prescribed medications for home.    __________________________________      __________________________________                   Patient Signature                                 Guardian/Responsible Adult Signature      __________________________________                 __________       ________                       Nurse Signature                                               Date                 Time

## 2017-01-26 NOTE — DISCHARGE INSTRUCTIONS
ACTIVITY: Rest and take it easy for the first 24 hours.  A responsible adult is recommended to remain with you during that time.  It is normal to feel sleepy.  We encourage you to not do anything that requires balance, judgment or coordination.    MILD FLU-LIKE SYMPTOMS ARE NORMAL. YOU MAY EXPERIENCE GENERALIZED MUSCLE ACHES, THROAT IRRITATION, HEADACHE AND/OR SOME NAUSEA.    FOR 24 HOURS DO NOT:  Drive, operate machinery or run household appliances.  Drink beer or alcoholic beverages.   Make important decisions or sign legal documents.    SPECIAL INSTRUCTIONS: See Below.    DIET: To avoid nausea, slowly advance diet as tolerated, avoiding spicy or greasy foods for the first day.  Add more substantial food to your diet according to your physician's instructions.  Babies can be fed formula or breast milk as soon as they are hungry.  INCREASE FLUIDS AND FIBER TO AVOID CONSTIPATION.    SURGICAL DRESSING/BATHIN. No heavy lifting greater than 10 pounds for minimum 6 weeks  2. Nothing in vagina (ie no tampons, douching, intercourse) for minimum of 6 weeks  3. No driving while taking narcotics   4. Return to our office as directed and call to confirm appointment  Call our office [phone] if you develop any fevers, chills, nausea/vomiting, heavy vaginal bleeding, or redness, tenderness, and/or drainage from your wound, if you have persistent watery discharge while ambulating or stool draining from the vagina .  5. Showering is ok after shower make sure wound is dry.   6. You may keep the wound dressing and change everyday. After 2 weeks from surgery you may keep the wound dressing off.   7. You may have vaginal spotting or light bleeding which is normal.  8. If you have not had a bowel movement for 2 days, please take over the counter Milk of Magnesium, 1 tablespoon every 4 hours. After 4 doses and if you still have not had a bowel movement, please call your doctor.   9. You may eat soft diet, such as soup,  liquid, for day #1 and if tolerating you may resume your regular diet.  10. Pt to d/c home with Painting catheter and call to get catheter removal on Tuesday 1/31      Painting Catheter Care, Adult  A Painting catheter is a soft, flexible tube. This tube is placed into your bladder to drain pee (urine). If you go home with this catheter in place, follow the instructions below.  TAKING CARE OF THE CATHETER  1. Wash your hands with soap and water.  2. Put soap and water on a clean washcloth.  ¨ Clean the skin where the tube goes into your body.  § Clean away from the tube site.  § Never wipe toward the tube.  § Clean the area using a circular motion.  ¨ Remove all the soap. Pat the area dry with a clean towel. For males, reposition the skin that covers the end of the penis (foreskin).  3. Attach the tube to your leg with tape or a leg strap. Do not stretch the tube tight. If you are using tape, remove any stickiness left behind by past tape you used.  4. Keep the drainage bag below your hips. Keep it off the floor.  5. Check your tube during the day. Make sure it is working and draining. Make sure the tube does not curl, twist, or bend.  6. Do not pull on the tube or try to take it out.  TAKING CARE OF THE DRAINAGE BAGS  You will have a large overnight drainage bag and a small leg bag. You may wear the overnight bag any time. Never wear the small bag at night. Follow the directions below.  Emptying the Drainage Bag  Empty your drainage bag when it is  -½ full or at least 2-3 times a day.  1. Wash your hands with soap and water.  2. Keep the drainage bag below your hips.  3. Hold the dirty bag over the toilet or clean container.  4. Open the pour spout at the bottom of the bag. Empty the pee into the toilet or container. Do not let the pour spout touch anything.  5. Clean the pour spout with a gauze pad or cotton ball that has rubbing alcohol on it.  6. Close the pour spout.  7. Attach the bag to your leg with tape or a leg  strap.  8. Wash your hands well.  Changing the Drainage Bag  Change your bag once a month or sooner if it starts to smell or look dirty.   1. Wash your hands with soap and water.  2. Pinch the rubber tube so that pee does not spill out.  3. Disconnect the catheter tube from the drainage tube at the connection valve. Do not let the tubes touch anything.  4. Clean the end of the catheter tube with an alcohol wipe. Clean the end of a the drainage tube with a different alcohol wipe.  5. Connect the catheter tube to the drainage tube of the clean drainage bag.  6. Attach the new bag to the leg with tape or a leg strap. Avoid attaching the new bag too tightly.  7. Wash your hands well.  Cleaning the Drainage Bag  1. Wash your hands with soap and water.  2. Wash the bag in warm, soapy water.  3. Rinse the bag with warm water.  4. Fill the bag with a mixture of white vinegar and water (1 cup vinegar to 1 quart warm water [.2 liter vinegar to 1 liter warm water]). Close the bag and soak it for 30 minutes in the solution.  5. Rinse the bag with warm water.  6. Hang the bag to dry with the pour spout open and hanging downward.  7. Store the clean bag (once it is dry) in a clean plastic bag.  8. Wash your hands well.  PREVENT INFECTION  · Wash your hands before and after touching your tube.  · Take showers every day. Wash the skin where the tube enters your body. Do not take baths. Replace wet leg straps with dry ones, if this applies.  · Do not use powders, sprays, or lotions on the genital area. Only use creams, lotions, or ointments as told by your doctor.  · For females, wipe from front to back after going to the bathroom.  · Drink enough fluids to keep your pee clear or pale yellow unless you are told not to have too much fluid (fluid restriction).  · Do not let the drainage bag or tubing touch or lie on the floor.  · Wear cotton underwear to keep the area dry.  GET HELP IF:  · Your pee is cloudy or smells unusually  bad.  · Your tube becomes clogged.  · You are not draining pee into the bag or your bladder feels full.  · Your tube starts to leak.  GET HELP RIGHT AWAY IF:  · You have pain, puffiness (swelling), redness, or yellowish-white fluid (pus) where the tube enters the body.  · You have pain in the belly (abdomen), legs, lower back, or bladder.  · You have a fever.  · You see blood fill the tube, or your pee is pink or red.  · You feel sick to your stomach (nauseous), throw up (vomit), or have chills.  · Your tube gets pulled out.  MAKE SURE YOU:   · Understand these instructions.  · Will watch your condition.  · Will get help right away if you are not doing well or get worse.     This information is not intended to replace advice given to you by your health care provider. Make sure you discuss any questions you have with your health care provider.     Document Released: 04/14/2014 Document Revised: 01/08/2016 Document Reviewed: 04/14/2014  [employer] Interactive Patient Education ©2016 [employer]        FOLLOW-UP APPOINTMENT:  A follow-up appointment should be arranged with your doctor on 01/31/2017 for catheter removal, call to schedule.    You should CALL YOUR PHYSICIAN if you develop:  Fever greater than 101 degrees F.  Pain not relieved by medication, or persistent nausea or vomiting.  Excessive bleeding (blood soaking through dressing) or unexpected drainage from the wound.  Extreme redness or swelling around the incision site, drainage of pus or foul smelling drainage.  Inability to urinate or empty your bladder within 8 hours.  Problems with breathing or chest pain.    You should call 911 if you develop problems with breathing or chest pain.  If you are unable to contact your doctor or surgical center, you should go to the nearest emergency room or urgent care center.  Physician's telephone #: [phone].    If any questions arise, call your doctor.  If your doctor is not available, please feel free to call the  Surgical Center at [phone].  The Center is open Monday through Friday from 7AM to 7PM.  You can also call the HEALTH HOTLINE open 24 hours/day, 7 days/week and speak to a nurse at [phone], or toll free at [phone].    A registered nurse may call you a few days after your surgery to see how you are doing after your procedure.    MEDICATIONS: Resume taking daily medication.  Take prescribed pain medication with food.  If no medication is prescribed, you may take non-aspirin pain medication if needed.  PAIN MEDICATION CAN BE VERY CONSTIPATING.  Take a stool softener or laxative such as senokot, pericolace, or milk of magnesia if needed.    Prescription given for Percocet, zofran, reglan. No oral pain medications given.     If your physician has prescribed pain medication that includes Acetaminophen (Tylenol), do not take additional Acetaminophen (Tylenol) while taking the prescribed medication.    Depression / Suicide Risk    As you are discharged from this [facility] facility, it is important to learn how to keep safe from harming yourself.    Recognize the warning signs:  · Abrupt changes in personality, positive or negative- including increase in energy   · Giving away possessions  · Change in eating patterns- significant weight changes-  positive or negative  · Change in sleeping patterns- unable to sleep or sleeping all the time   · Unwillingness or inability to communicate  · Depression  · Unusual sadness, discouragement and loneliness  · Talk of wanting to die  · Neglect of personal appearance   · Rebelliousness- reckless behavior  · Withdrawal from people/activities they love  · Confusion- inability to concentrate     If you or a loved one observes any of these behaviors or has concerns about self-harm, here's what you can do:  · Talk about it- your feelings and reasons for harming yourself  · Remove any means that you might use to hurt yourself (examples: pills, rope, extension cords,  firearm)  · Get professional help from the community (Mental Health, Substance Abuse, psychological counseling)  · Do not be alone:Call your Safe Contact- someone whom you trust who will be there for you.  · Call your local CRISIS HOTLINE 582-8973 or 712-827-7371  · Call your local Children's Mobile Crisis Response Team [place] (885) 104-3345 or www.Raise  · Call the toll free National Suicide Prevention Hotlines   · National Suicide Prevention Lifeline 597-084-IVPY (6452)  · National Hope Line Network 800-SUICIDE (161-5946)

## 2017-01-27 ENCOUNTER — HOSPITAL ENCOUNTER (INPATIENT)
Facility: MEDICAL CENTER | Age: 77
LOS: 3 days | DRG: 205 | End: 2017-02-02
Attending: EMERGENCY MEDICINE | Admitting: OBSTETRICS & GYNECOLOGY
Payer: MEDICARE

## 2017-01-27 ENCOUNTER — APPOINTMENT (OUTPATIENT)
Dept: RADIOLOGY | Facility: MEDICAL CENTER | Age: 77
DRG: 205 | End: 2017-01-27
Attending: SPECIALIST
Payer: MEDICARE

## 2017-01-27 ENCOUNTER — RESOLUTE PROFESSIONAL BILLING HOSPITAL PROF FEE (OUTPATIENT)
Dept: HOSPITALIST | Facility: MEDICAL CENTER | Age: 77
End: 2017-01-27
Payer: MEDICARE

## 2017-01-27 ENCOUNTER — RESOLUTE PROFESSIONAL BILLING HOSPITAL PROF FEE (OUTPATIENT)
Dept: CARDIOLOGY | Facility: MEDICAL CENTER | Age: 77
End: 2017-01-27
Payer: MEDICARE

## 2017-01-27 DIAGNOSIS — E87.1 HYPONATREMIA: ICD-10-CM

## 2017-01-27 DIAGNOSIS — R11.0 NAUSEA: ICD-10-CM

## 2017-01-27 DIAGNOSIS — J02.9 SORE THROAT: ICD-10-CM

## 2017-01-27 DIAGNOSIS — R53.81 MALAISE: ICD-10-CM

## 2017-01-27 DIAGNOSIS — D72.829 LEUKOCYTOSIS, UNSPECIFIED TYPE: ICD-10-CM

## 2017-01-27 PROBLEM — R06.00 DYSPNEA: Status: ACTIVE | Noted: 2017-01-27

## 2017-01-27 LAB
ANION GAP SERPL CALC-SCNC: 11 MMOL/L (ref 0–11.9)
BASOPHILS # BLD AUTO: 0.2 % (ref 0–1.8)
BASOPHILS # BLD: 0.04 K/UL (ref 0–0.12)
BUN SERPL-MCNC: 14 MG/DL (ref 8–22)
CALCIUM SERPL-MCNC: 8.2 MG/DL (ref 8.5–10.5)
CHLORIDE SERPL-SCNC: 87 MMOL/L (ref 96–112)
CO2 SERPL-SCNC: 23 MMOL/L (ref 20–33)
CREAT SERPL-MCNC: 0.62 MG/DL (ref 0.5–1.4)
EOSINOPHIL # BLD AUTO: 0.01 K/UL (ref 0–0.51)
EOSINOPHIL NFR BLD: 0.1 % (ref 0–6.9)
ERYTHROCYTE [DISTWIDTH] IN BLOOD BY AUTOMATED COUNT: 45.3 FL (ref 35.9–50)
GFR SERPL CREATININE-BSD FRML MDRD: >60 ML/MIN/1.73 M 2
GLUCOSE SERPL-MCNC: 130 MG/DL (ref 65–99)
HCT VFR BLD AUTO: 32.8 % (ref 37–47)
HGB BLD-MCNC: 10.9 G/DL (ref 12–16)
IMM GRANULOCYTES # BLD AUTO: 0.04 K/UL (ref 0–0.11)
IMM GRANULOCYTES NFR BLD AUTO: 0.2 % (ref 0–0.9)
LYMPHOCYTES # BLD AUTO: 1.27 K/UL (ref 1–4.8)
LYMPHOCYTES NFR BLD: 7.2 % (ref 22–41)
MCH RBC QN AUTO: 31 PG (ref 27–33)
MCHC RBC AUTO-ENTMCNC: 33.2 G/DL (ref 33.6–35)
MCV RBC AUTO: 93.2 FL (ref 81.4–97.8)
MONOCYTES # BLD AUTO: 2.14 K/UL (ref 0–0.85)
MONOCYTES NFR BLD AUTO: 12.1 % (ref 0–13.4)
NEUTROPHILS # BLD AUTO: 14.25 K/UL (ref 2–7.15)
NEUTROPHILS NFR BLD: 80.2 % (ref 44–72)
NRBC # BLD AUTO: 0 K/UL
NRBC BLD AUTO-RTO: 0 /100 WBC
PLATELET # BLD AUTO: 214 K/UL (ref 164–446)
PMV BLD AUTO: 9.8 FL (ref 9–12.9)
POTASSIUM SERPL-SCNC: 3.8 MMOL/L (ref 3.6–5.5)
RBC # BLD AUTO: 3.52 M/UL (ref 4.2–5.4)
SODIUM SERPL-SCNC: 121 MMOL/L (ref 135–145)
WBC # BLD AUTO: 17.8 K/UL (ref 4.8–10.8)

## 2017-01-27 PROCEDURE — 700105 HCHG RX REV CODE 258: Performed by: EMERGENCY MEDICINE

## 2017-01-27 PROCEDURE — 96374 THER/PROPH/DIAG INJ IV PUSH: CPT

## 2017-01-27 PROCEDURE — G0378 HOSPITAL OBSERVATION PER HR: HCPCS

## 2017-01-27 PROCEDURE — 700102 HCHG RX REV CODE 250 W/ 637 OVERRIDE(OP): Performed by: SPECIALIST

## 2017-01-27 PROCEDURE — 96375 TX/PRO/DX INJ NEW DRUG ADDON: CPT

## 2017-01-27 PROCEDURE — 99285 EMERGENCY DEPT VISIT HI MDM: CPT

## 2017-01-27 PROCEDURE — A9270 NON-COVERED ITEM OR SERVICE: HCPCS | Performed by: SPECIALIST

## 2017-01-27 PROCEDURE — 80048 BASIC METABOLIC PNL TOTAL CA: CPT

## 2017-01-27 PROCEDURE — 94760 N-INVAS EAR/PLS OXIMETRY 1: CPT

## 2017-01-27 PROCEDURE — 94667 MNPJ CHEST WALL 1ST: CPT

## 2017-01-27 PROCEDURE — 31720 CLEARANCE OF AIRWAYS: CPT

## 2017-01-27 PROCEDURE — 700111 HCHG RX REV CODE 636 W/ 250 OVERRIDE (IP): Performed by: EMERGENCY MEDICINE

## 2017-01-27 PROCEDURE — 700105 HCHG RX REV CODE 258: Performed by: SPECIALIST

## 2017-01-27 PROCEDURE — 36415 COLL VENOUS BLD VENIPUNCTURE: CPT

## 2017-01-27 PROCEDURE — 700102 HCHG RX REV CODE 250 W/ 637 OVERRIDE(OP): Performed by: EMERGENCY MEDICINE

## 2017-01-27 PROCEDURE — 700101 HCHG RX REV CODE 250: Performed by: SPECIALIST

## 2017-01-27 PROCEDURE — 94640 AIRWAY INHALATION TREATMENT: CPT

## 2017-01-27 PROCEDURE — 85025 COMPLETE CBC W/AUTO DIFF WBC: CPT

## 2017-01-27 PROCEDURE — 700111 HCHG RX REV CODE 636 W/ 250 OVERRIDE (IP): Performed by: SPECIALIST

## 2017-01-27 PROCEDURE — 96361 HYDRATE IV INFUSION ADD-ON: CPT

## 2017-01-27 RX ORDER — METOCLOPRAMIDE 10 MG/1
10 TABLET ORAL EVERY 6 HOURS PRN
Status: DISCONTINUED | OUTPATIENT
Start: 2017-01-27 | End: 2017-02-02 | Stop reason: HOSPADM

## 2017-01-27 RX ORDER — MONTELUKAST SODIUM 10 MG/1
10 TABLET ORAL DAILY
Status: DISCONTINUED | OUTPATIENT
Start: 2017-01-27 | End: 2017-02-02 | Stop reason: HOSPADM

## 2017-01-27 RX ORDER — SODIUM CHLORIDE 9 MG/ML
INJECTION, SOLUTION INTRAVENOUS CONTINUOUS
Status: DISCONTINUED | OUTPATIENT
Start: 2017-01-27 | End: 2017-01-28

## 2017-01-27 RX ORDER — ALBUTEROL SULFATE 90 UG/1
2 AEROSOL, METERED RESPIRATORY (INHALATION) EVERY 6 HOURS PRN
Status: DISCONTINUED | OUTPATIENT
Start: 2017-01-27 | End: 2017-02-02 | Stop reason: HOSPADM

## 2017-01-27 RX ORDER — DEXTROSE MONOHYDRATE, SODIUM CHLORIDE, AND POTASSIUM CHLORIDE 50; 1.49; 4.5 G/1000ML; G/1000ML; G/1000ML
INJECTION, SOLUTION INTRAVENOUS CONTINUOUS
Status: DISCONTINUED | OUTPATIENT
Start: 2017-01-27 | End: 2017-01-28

## 2017-01-27 RX ORDER — LORAZEPAM 1 MG/1
0.25 TABLET ORAL EVERY 6 HOURS PRN
Status: DISCONTINUED | OUTPATIENT
Start: 2017-01-27 | End: 2017-02-02 | Stop reason: HOSPADM

## 2017-01-27 RX ORDER — NAPROXEN 250 MG/1
250 TABLET ORAL
Status: DISCONTINUED | OUTPATIENT
Start: 2017-01-27 | End: 2017-02-02 | Stop reason: HOSPADM

## 2017-01-27 RX ORDER — CYANOCOBALAMIN (VITAMIN B-12) 500 MCG
1 LOZENGE ORAL DAILY
Status: DISCONTINUED | OUTPATIENT
Start: 2017-01-27 | End: 2017-01-27

## 2017-01-27 RX ORDER — ONDANSETRON 2 MG/ML
4 INJECTION INTRAMUSCULAR; INTRAVENOUS ONCE
Status: COMPLETED | OUTPATIENT
Start: 2017-01-27 | End: 2017-01-27

## 2017-01-27 RX ORDER — ONDANSETRON 2 MG/ML
4 INJECTION INTRAMUSCULAR; INTRAVENOUS EVERY 6 HOURS PRN
Status: DISCONTINUED | OUTPATIENT
Start: 2017-01-27 | End: 2017-02-02 | Stop reason: HOSPADM

## 2017-01-27 RX ORDER — OXYCODONE HYDROCHLORIDE AND ACETAMINOPHEN 5; 325 MG/1; MG/1
1 TABLET ORAL EVERY 4 HOURS PRN
Status: DISCONTINUED | OUTPATIENT
Start: 2017-01-27 | End: 2017-02-02 | Stop reason: HOSPADM

## 2017-01-27 RX ORDER — CHLORAL HYDRATE 500 MG
1000 CAPSULE ORAL DAILY
Status: DISCONTINUED | OUTPATIENT
Start: 2017-01-28 | End: 2017-02-02 | Stop reason: HOSPADM

## 2017-01-27 RX ORDER — FAMOTIDINE 20 MG/1
20 TABLET, FILM COATED ORAL 2 TIMES DAILY
Status: DISCONTINUED | OUTPATIENT
Start: 2017-01-27 | End: 2017-01-28

## 2017-01-27 RX ORDER — SODIUM CHLORIDE 9 MG/ML
1000 INJECTION, SOLUTION INTRAVENOUS ONCE
Status: COMPLETED | OUTPATIENT
Start: 2017-01-27 | End: 2017-01-27

## 2017-01-27 RX ORDER — DEXTROSE MONOHYDRATE, SODIUM CHLORIDE, AND POTASSIUM CHLORIDE 50; 1.49; 4.5 G/1000ML; G/1000ML; G/1000ML
INJECTION, SOLUTION INTRAVENOUS CONTINUOUS
Status: DISCONTINUED | OUTPATIENT
Start: 2017-01-27 | End: 2017-01-27

## 2017-01-27 RX ORDER — SODIUM CHLORIDE 9 MG/ML
500 INJECTION, SOLUTION INTRAVENOUS PRN
Status: DISCONTINUED | OUTPATIENT
Start: 2017-01-27 | End: 2017-02-02 | Stop reason: HOSPADM

## 2017-01-27 RX ORDER — LORATADINE 10 MG/1
5 TABLET ORAL DAILY
Status: DISCONTINUED | OUTPATIENT
Start: 2017-01-28 | End: 2017-01-29

## 2017-01-27 RX ORDER — ONDANSETRON HYDROCHLORIDE 4 MG/5ML
4 SOLUTION ORAL EVERY 8 HOURS
Status: DISCONTINUED | OUTPATIENT
Start: 2017-01-27 | End: 2017-01-27

## 2017-01-27 RX ADMIN — POTASSIUM CHLORIDE, DEXTROSE MONOHYDRATE AND SODIUM CHLORIDE: 150; 5; 450 INJECTION, SOLUTION INTRAVENOUS at 13:43

## 2017-01-27 RX ADMIN — FAMOTIDINE 20 MG: 20 TABLET, FILM COATED ORAL at 20:55

## 2017-01-27 RX ADMIN — MONTELUKAST SODIUM 10 MG: 10 TABLET, FILM COATED ORAL at 20:55

## 2017-01-27 RX ADMIN — LORAZEPAM 0.25 MG: 1 TABLET ORAL at 13:37

## 2017-01-27 RX ADMIN — HYDROCODONE BITARTRATE AND ACETAMINOPHEN 15 ML: 2.5; 108 SOLUTION ORAL at 17:58

## 2017-01-27 RX ADMIN — RACEPINEPHRINE HYDROCHLORIDE 0.5 ML: 11.25 SOLUTION RESPIRATORY (INHALATION) at 05:44

## 2017-01-27 RX ADMIN — HYDROMORPHONE HYDROCHLORIDE 0.5 MG: 1 INJECTION, SOLUTION INTRAMUSCULAR; INTRAVENOUS; SUBCUTANEOUS at 05:58

## 2017-01-27 RX ADMIN — SODIUM CHLORIDE: 9 INJECTION, SOLUTION INTRAVENOUS at 10:27

## 2017-01-27 RX ADMIN — CEFTRIAXONE 2 G: 2 INJECTION, POWDER, FOR SOLUTION INTRAMUSCULAR; INTRAVENOUS at 21:59

## 2017-01-27 RX ADMIN — SODIUM CHLORIDE 1000 ML: 9 INJECTION, SOLUTION INTRAVENOUS at 05:58

## 2017-01-27 RX ADMIN — FAMOTIDINE 20 MG: 20 TABLET, FILM COATED ORAL at 13:36

## 2017-01-27 RX ADMIN — ONDANSETRON 4 MG: 2 INJECTION, SOLUTION INTRAMUSCULAR; INTRAVENOUS at 05:58

## 2017-01-27 ASSESSMENT — LIFESTYLE VARIABLES
EVER_SMOKED: YES
EVER_SMOKED: YES
DO YOU DRINK ALCOHOL: NO
EVER_SMOKED: YES
ALCOHOL_USE: NO

## 2017-01-27 ASSESSMENT — COPD QUESTIONNAIRES
DURING THE PAST 4 WEEKS HOW MUCH DID YOU FEEL SHORT OF BREATH: SOME OF THE TIME
HAVE YOU SMOKED AT LEAST 100 CIGARETTES IN YOUR ENTIRE LIFE: YES
DO YOU EVER COUGH UP ANY MUCUS OR PHLEGM?: YES, A FEW DAYS A WEEK OR MONTH
COPD SCREENING SCORE: 7

## 2017-01-27 ASSESSMENT — PATIENT HEALTH QUESTIONNAIRE - PHQ9
1. LITTLE INTEREST OR PLEASURE IN DOING THINGS: NOT AT ALL
2. FEELING DOWN, DEPRESSED, IRRITABLE, OR HOPELESS: NOT AT ALL
SUM OF ALL RESPONSES TO PHQ9 QUESTIONS 1 AND 2: 0
SUM OF ALL RESPONSES TO PHQ QUESTIONS 1-9: 0

## 2017-01-27 ASSESSMENT — PAIN SCALES - GENERAL
PAINLEVEL_OUTOF10: 0
PAINLEVEL_OUTOF10: 1
PAINLEVEL_OUTOF10: 5

## 2017-01-27 NOTE — OR NURSING
Pt states that she is feeling better after her breathing treatment from RT, sitting up, drinking plenty of fluids and able to reach 2000 ML on IS. Oxygen titrated down to 1 L NC and then down to 0.5 L NC after maintaining oxygen above 93% for 30 min. Waiting for Dr. Bearden to come see the patient when he is finished in the OR.

## 2017-01-27 NOTE — ED PROVIDER NOTES
ED Provider Note    Scribed for Shawn Cedeño M.D. by Stacia Hua. 1/27/2017  4:58 AM    Primary care provider: Alan Singh M.D.  Means of arrival: Ambulance   History obtained from: Patient   History limited by: None     CHIEF COMPLAINT  Chief Complaint   Patient presents with   • Shortness of Breath       HPI  Stephanie Guerrero is a 76 y.o. female who presents to the Emergency Department with shortness of breath. Patient is complaining of phlegm in the throat that makes her feel like she is choking and makes it difficulty to breathe. She has a history of asthma and weak vocal chords due to history of polio. She had a hysterectomy last night. Patient denies fever or chills.     REVIEW OF SYSTEMS  Pertinent positives include shortness of breath.   Pertinent negatives include no fever, chills.    All other systems reviewed and negative.      PAST MEDICAL HISTORY   has a past medical history of DM type 2 (diabetes mellitus, type 2) (CMS-[facility]); MEDICAL HOME (4/19/2013); Impacted ear wax (8/19/2013); Sinusitis (10/7/2014); Bronchitis (10/7/2014); Asthma; Elevated blood pressure (6/5/2015); Hiatus hernia syndrome; Breath shortness; Anesthesia; Anesthesia; and Bulbar polio.    SURGICAL HISTORY   has past surgical history that includes other; tonsillectomy; cataract extraction with iol; hysterectomy robotic xi (1/26/2017); and anterior repair (1/26/2017).    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     Current Outpatient Prescriptions on File Prior to Encounter   Medication Sig Dispense Refill   • montelukast (SINGULAIR) 10 MG Tab Take 10 mg by mouth every day.     • Naproxen Sodium (ALEVE) 220 MG Cap Take 1 Cap by mouth 1 time daily as needed.     • Misc Natural Products (ECHINACEA GOLDENSEAL PLUS PO) Take 1 Each by mouth 1 time daily as needed. tincture     • GIAN D ARCO PO Take 1 Each by mouth 3 times a day. Tincture     • oxycodone-acetaminophen (PERCOCET) 5-325 MG Tab Take 1 Tab by mouth every four hours as needed. 60 Tab 0   • metoclopramide (REGLAN) 10 MG Tab Take 1 Tab by mouth every 6 hours as needed. 30 Tab 0   • ondansetron (ZOFRAN) 4 MG Tab tablet Take 1 Tab by mouth every 6 hours as needed. 30 Tab 0   • multivitamin (THERAGRAN) Tab Take 1 Tab by mouth every day.     • Potassium 99 MG Tab Take 1 Tab by mouth every day.     • Chromium 1 MG Cap Take 1 Cap by mouth every day.     • L-GLUTAMINE PO Take 1 Tab by mouth every day.     • PROAIR  (90 BASE) MCG/ACT Aero Soln inhalation aerosol Inhale 2 Puffs by mouth every 6 hours as needed for Shortness of Breath. 1 Inhaler 3   • Cholecalciferol (VITAMIN D-3 PO) Take 4,000 Units by mouth every day.     • Coenzyme Q10 (CO Q 10 PO) Take 1 Tab by mouth every day.     • Probiotic Product (PROBIOTIC PO) Take 1 Tab by mouth every day.     • Omega-3 Fatty Acids (OMEGA 3 PO) Take 1 Tab by mouth every day.     • Loratadine (CLARITIN PO) Take 1 Tab by mouth every day.         ALLERGIES  Allergies   Allergen Reactions   • Enalapril Rash      SPI=0609       PHYSICAL EXAM  VITAL SIGNS: /69 mmHg  Pulse 92  Temp(Src) 36.6 °C (97.9 °F)  Resp 22  Ht 1.575 m (5' 2\")  Wt 57.607 kg (127 lb)  BMI 23.22 kg/m2  SpO2 96%    Nursing note and vitals reviewed.  Constitutional: " Well-developed and well-nourished. Anxious.   HENT: Head is normocephalic and atraumatic. Oropharynx is clear and moist without exudate or erythema.   Eyes: Pupils are equal, round, and reactive to light. Conjunctiva are normal.   Cardiovascular: Normal rate and regular rhythm. No murmur heard. Normal radial pulses.  Pulmonary/Chest: No wheezes or rales. Raspy voice. Rhonchi.   Abdominal: Soft and non-tender. No distention    Musculoskeletal: Extremities exhibit normal range of motion without edema or tenderness.   Neurological: Awake, alert and oriented to person, place, and time. No focal deficits noted.  Skin: Skin is warm and dry. No rash.   Psychiatric: Somewhat anxious but otherwise Normal mood and affect. Appropriate for clinical situation    Labs  Results for orders placed or performed during the hospital encounter of 01/27/17   CBC WITH DIFFERENTIAL   Result Value Ref Range    WBC 17.8 (H) 4.8 - 10.8 K/uL    RBC 3.52 (L) 4.20 - 5.40 M/uL    Hemoglobin 10.9 (L) 12.0 - 16.0 g/dL    Hematocrit 32.8 (L) 37.0 - 47.0 %    MCV 93.2 81.4 - 97.8 fL    MCH 31.0 27.0 - 33.0 pg    MCHC 33.2 (L) 33.6 - 35.0 g/dL    RDW 45.3 35.9 - 50.0 fL    Platelet Count 214 164 - 446 K/uL    MPV 9.8 9.0 - 12.9 fL    Neutrophils-Polys 80.20 (H) 44.00 - 72.00 %    Lymphocytes 7.20 (L) 22.00 - 41.00 %    Monocytes 12.10 0.00 - 13.40 %    Eosinophils 0.10 0.00 - 6.90 %    Basophils 0.20 0.00 - 1.80 %    Immature Granulocytes 0.20 0.00 - 0.90 %    Nucleated RBC 0.00 /100 WBC    Neutrophils (Absolute) 14.25 (H) 2.00 - 7.15 K/uL    Lymphs (Absolute) 1.27 1.00 - 4.80 K/uL    Monos (Absolute) 2.14 (H) 0.00 - 0.85 K/uL    Eos (Absolute) 0.01 0.00 - 0.51 K/uL    Baso (Absolute) 0.04 0.00 - 0.12 K/uL    Immature Granulocytes (abs) 0.04 0.00 - 0.11 K/uL    NRBC (Absolute) 0.00 K/uL   BASIC METABOLIC PANEL   Result Value Ref Range    Sodium 121 (L) 135 - 145 mmol/L    Potassium 3.8 3.6 - 5.5 mmol/L    Chloride 87 (L) 96 - 112 mmol/L    Co2 23 20 -  33 mmol/L    Glucose 130 (H) 65 - 99 mg/dL    Bun 14 8 - 22 mg/dL    Creatinine 0.62 0.50 - 1.40 mg/dL    Calcium 8.2 (L) 8.5 - 10.5 mg/dL    Anion Gap 11.0 0.0 - 11.9   ESTIMATED GFR   Result Value Ref Range    GFR If African American >60 >60 mL/min/1.73 m 2    GFR If Non African American >60 >60 mL/min/1.73 m 2   All labs reviewed by me.     COURSE & MEDICAL DECISION MAKING   Nursing notes, VS, PMSFHx reviewed in chart.     Review of past medical records shows the patient had surgery last night.      4:58 AM - Patient seen and examined at bedside. Patient treated with IV fluids, Dilaudid 0.5 mg IV, Zofran 4 mg IV, and Micronefrin 0.5 mL nebulizer. Ordered chest physio therapy, CBC with differential, and BMP. The patient presents today with phlegm that she is having difficulty clearing.     5:03 AM Paged respiratory.     5:45 AM Respiratory was unable to remove the phlegm.     5:54 AM I informed the patient's family of her condition and plans to admit.     6:23 AM Upon reassessment, patient told she should rest in bed.     6:24 AM Paged Dr. Bearden.      6:44 AM I discussed the patient's case and the above findings with Dr. Bearden (gynecologic oncology) who will admit the patient.       DISPOSITION:  Patient will be admitted to Dr. Bearden in guarded condition.      FINAL IMPRESSION  1. Sore throat    2. Hyponatremia    3. Leukocytosis, unspecified type    4. Malaise    5. Nausea          Stacia MELENDREZ (Manisha), am scribing for, and in the presence of, Shawn Cedeño M.D..    Electronically signed by: Stacia Hua (Manisha), 1/27/2017    Shawn MELENDREZ M.D. personally performed the services described in this documentation, as scribed by Stacia Hua in my presence, and it is both accurate and complete.    The note accurately reflects work and decisions made by me.  Shawn Cedeño  1/27/2017  9:17 AM

## 2017-01-27 NOTE — OR NURSING
Call to Dr. Bearden to update on pt condition. Patient is awake and alert, Pt off supplemental O2 for the last hour, oxygen saturation 93-97% on room air. Both pt and Daughter feel that she is back at baseline. Per Dr. Bearden ok to discharge home. Pt to return to ER if any problems.

## 2017-01-27 NOTE — ED NOTES
unable to complete med rec at this time, patient stated she doesn't know what she takes, she doesn't carry a med list and she stated she's to drugged up to tell me anything

## 2017-01-27 NOTE — IP AVS SNAPSHOT
" After Visit Summary                                                                                                                  Name:Stephanie Guerrero  Medical Record Number:[id number]  CSN: [id number]    YOB: 1940   Age: 76 y.o.  Sex: female  HT:1.575 m (5' 2\") WT: 53.4 kg (117 lb 11.6 oz)          Admit Date: 1/27/2017     Discharge Date:   Today's Date: 2/2/2017  Attending Doctor:  Gabby Corbett D.O.                  Allergies:  Enalapril            Discharge Instructions       Discharge Instructions    Discharged to home by car with relative. Discharged via wheelchair, hospital escort: Yes.  Special equipment needed: Not Applicable    Be sure to schedule a follow-up appointment with your primary care doctor or any specialists as instructed.     Discharge Plan:   Diet Plan: Discussed  Activity Level: Discussed  Confirmed Follow up Appointment: Patient to Call and Schedule Appointment  Confirmed Symptoms Management: Discussed  Medication Reconciliation Updated: Yes  Influenza Vaccine Indication: Patient Refuses    I understand that a diet low in cholesterol, fat, and sodium is recommended for good health. Unless I have been given specific instructions below for another diet, I accept this instruction as my diet prescription.   Other diet: as tolerated    Special Instructions: None    · Is patient discharged on Warfarin / Coumadin?   No     · Is patient Post Blood Transfusion?  No    Depression / Suicide Risk    As you are discharged from this [facility] facility, it is important to learn how to keep safe from harming yourself.    Recognize the warning signs:  · Abrupt changes in personality, positive or negative- including increase in energy   · Giving away possessions  · Change in eating patterns- significant weight changes-  positive or negative  · Change in sleeping patterns- unable to sleep or sleeping all the time   · Unwillingness or inability to communicate  · Depression  · Unusual " sadness, discouragement and loneliness  · Talk of wanting to die  · Neglect of personal appearance   · Rebelliousness- reckless behavior  · Withdrawal from people/activities they love  · Confusion- inability to concentrate     If you or a loved one observes any of these behaviors or has concerns about self-harm, here's what you can do:  · Talk about it- your feelings and reasons for harming yourself  · Remove any means that you might use to hurt yourself (examples: pills, rope, extension cords, firearm)  · Get professional help from the community (Mental Health, Substance Abuse, psychological counseling)  · Do not be alone:Call your Safe Contact- someone whom you trust who will be there for you.  · Call your local CRISIS HOTLINE 531-0273 or 045-114-9050  · Call your local Children's Mobile Crisis Response Team [place] (761) 477-5315 or www.Guestmob  · Call the toll free National Suicide Prevention Hotlines   · National Suicide Prevention Lifeline 460-388-KXKO (6455)  · [facility] Hope [facility] Network 800-SUICIDE (257-3800)        Your appointments     Feb 14, 2017 12:40 PM   HOSPITAL FOLLOW UP with JENNY Littlejohn.   [facility] (--)    [address], [facility] [address]  [place] [address] [phone]              Follow-up Information     1. Follow up with Alan Singh M.D. In 2 weeks.    Specialty:  Internal Medicine    Contact information    [address] [facility] [address]  [place] [address] [phone]          2. Follow up with Liborio Bearden M.D. In 2 weeks.    Specialty:  Gynecologic Oncology    Contact information    [address] [facility] [place] [address] [phone]           Discharge Medication Instructions:    Below are the medications your physician expects you to take upon discharge:    Review all your home medications and newly ordered medications with your doctor and/or pharmacist. Follow medication instructions as directed by your doctor and/or  pharmacist.    Please keep your medication list with you and share with your physician.               Medication List      START taking these medications        Instructions    aspirin 81 MG Chew chewable tablet   Last time this was given:  81 mg on 1/31/2017  8:47 AM   Commonly known as:  ASA    Take 1 Tab by mouth every day.   Dose:  81 mg       predniSONE 10 MG Tabs   Last time this was given:  50 mg on 1/31/2017  8:46 AM   Commonly known as:  DELTASONE    40mg po qday x 3 days then  30 mg po qday x 3 days 20 mp po qday x 3 days  10 mp po qday x 3 days       sodium chloride 1 GM Tabs   Last time this was given:  1 g on 2/1/2017  5:39 PM   Commonly known as:  SALT    Take 1 Tab by mouth 3 times a day, with meals for 14 days.   Dose:  1 g         CONTINUE taking these medications        Instructions    ALEVE 220 MG Caps   Generic drug:  Naproxen Sodium    Take 1 Cap by mouth 1 time daily as needed.   Dose:  1 Cap       Chromium 1 MG Caps    Take 1 Cap by mouth every day.   Dose:  1 Cap       CLARITIN PO   Last time this was given:  10 mg on 1/31/2017  8:45 AM    Take 1 Tab by mouth every day.   Dose:  1 Tab       CO Q 10 PO    Take 1 Tab by mouth every day.   Dose:  1 Tab       ECHINACEA GOLDENSEAL PLUS PO    Take 1 Each by mouth 1 time daily as needed. tincture   Dose:  1 Each       L-GLUTAMINE PO    Take 1 Tab by mouth every day.   Dose:  1 Tab       metoclopramide 10 MG Tabs   Commonly known as:  REGLAN    Take 1 Tab by mouth every 6 hours as needed.   Dose:  10 mg       montelukast 10 MG Tabs   Last time this was given:  10 mg on 2/1/2017  8:04 PM   Commonly known as:  SINGULAIR    Take 10 mg by mouth every day.   Dose:  10 mg       multivitamin Tabs   Last time this was given:  1 Tab on 1/30/2017  8:18 AM    Take 1 Tab by mouth every day.   Dose:  1 Tab       OMEGA 3 PO   Last time this was given:  1,000 mg on 1/30/2017  9:00 AM    Take 1 Tab by mouth every day.   Dose:  1 Tab       ondansetron 4 MG Tabs  tablet   Commonly known as:  ZOFRAN    Take 1 Tab by mouth every 6 hours as needed.   Dose:  4 mg       oxycodone-acetaminophen 5-325 MG Tabs   Last time this was given:  1 Tab on 1/29/2017  8:42 PM   Commonly known as:  PERCOCET    Take 1 Tab by mouth every four hours as needed.   Dose:  1 Tab       GIAN D ARCO PO    Take 1 Each by mouth 3 times a day. Tincture   Dose:  1 Each       Potassium 99 MG Tabs    Take 1 Tab by mouth every day.   Dose:  1 Tab       PROAIR  (90 BASE) MCG/ACT Aers inhalation aerosol   Last time this was given:  2 Puffs on 2/1/2017  4:21 AM   Generic drug:  albuterol    Inhale 2 Puffs by mouth every 6 hours as needed for Shortness of Breath.   Dose:  2 Puff       PROBIOTIC PO    Take 1 Tab by mouth every day.   Dose:  1 Tab       VITAMIN D-3 PO    Take 4,000 Units by mouth every day.   Dose:  4000 Units               Instructions           Diet / Nutrition:    Follow any diet instructions given to you by your doctor or the dietician, including how much salt (sodium) you are allowed each day.    If you are overweight, talk to your doctor about a weight reduction plan.    Activity:    Remain physically active following your doctor's instructions about exercise and activity.    Rest often.     Any time you become even a little tired or short of breath, SIT DOWN and rest.    Worsening Symptoms:    Report any of the following signs and symptoms to the doctor's office immediately:    *Pain of jaw, arm, or neck  *Chest pain not relieved by medication                               *Dizziness or loss of consciousness  *Difficulty breathing even when at rest   *More tired than usual                                       *Bleeding drainage or swelling of surgical site  *Swelling of feet, ankles, legs or stomach                 *Fever (>100ºF)  *Pink or blood tinged sputum  *Weight gain (3lbs/day or 5lbs /week)           *Shock from internal defibrillator (if applicable)  *Palpitations or irregular  heartbeats                *Cool and/or numb extremities    Stroke Awareness    Common Risk Factors for Stroke include:    Age  Atrial Fibrillation  Carotid Artery Stenosis  Diabetes Mellitus  Excessive alcohol consumption  High blood pressure  Overweight   Physical inactivity  Smoking    Warning signs and symptoms of a stroke include:    *Sudden numbness or weakness of the face, arm or leg (especially on one side of the body).  *Sudden confusion, trouble speaking or understanding.  *Sudden trouble seeing in one or both eyes.  *Sudden trouble walking, dizziness, loss of balance or coordination.Sudden severe headache with no known cause.    It is very important to get treatment quickly when a stroke occurs. If you experience any of the above warning signs, call 911 immediately.                   Disclaimer         Quit Smoking / Tobacco Use:    I understand the use of any tobacco products increases my chance of suffering from future heart disease or stroke and could cause other illnesses which may shorten my life. Quitting the use of tobacco products is the single most important thing I can do to improve my health. For further information on smoking / tobacco cessation call a Toll Free Quit Line at 1-608.958.8817 (*National [employer] [place]) or 1-121.298.4259 (American Lung Association) or you can access the web based program at www.lungusa.org.    [place] Tobacco Users Help Line:  (469) 380-1866       Toll Free: 1-453.892.5960  Quit Tobacco Program [facility] Management Services (889)812-7835    Crisis Hotline:    [place] Crisis Hotline:  7-167-VJWXBBV or 1-432.698.2561    [place] Crisis Hotline:    1-330.974.3018 or 132-229-6862    Discharge Survey:   Thank you for choosing [facility]. We hope we did everything we could to make your hospital stay a pleasant one. You may be receiving a phone survey and we would appreciate your time and participation in answering the questions. Your input is very valuable to us  in our efforts to improve our service to our patients and their families.        My signature on this form indicates that:    1. I have reviewed and understand the above information.  2. My questions regarding this information have been answered to my satisfaction.  3. I have formulated a plan with my discharge nurse to obtain my prescribed medications for home.                  Disclaimer         __________________________________                     __________       ________                       Patient Signature                                                 Date                    Time

## 2017-01-27 NOTE — ED NOTES
Patient states feels like there is phlegm in throat. Patient encouraged to cough. SPO2 maintained. Patient reassured.

## 2017-01-27 NOTE — ED NOTES
"Pt to ER per squad. Pt c/o \"phlem in throat\" that's making it hard to breathe and feels like choking. Pt states has weak vocal cords because of hx of polio. Pt was just d/c last night after having hysterectomy. Pt to ER skin pink warm dry, lung sounds clear.    "

## 2017-01-27 NOTE — IP AVS SNAPSHOT
Zympi Access Code: Activation code not generated  Current Zympi Status: Active    Posteroushart  A secure, online tool to manage your health information     [employer]’s Zympi® is a secure, online tool that connects you to your personalized health information from the privacy of your home -- day or night - making it very easy for you to manage your healthcare. Once the activation process is completed, you can even access your medical information using the Zympi mindy, which is available for free in the Apple Mindy store or Google Play store.     Zympi provides the following levels of access (as shown below):   My Chart Features   [facility] Primary Care Doctor [facility]  Specialists [facility]  Urgent  Care Non-[facility]  Primary Care  Doctor   Email your healthcare team securely and privately 24/7 X X X X   Manage appointments: schedule your next appointment; view details of past/upcoming appointments X      Request prescription refills. X      View recent personal medical records, including lab and immunizations X X X X   View health record, including health history, allergies, medications X X X X   Read reports about your outpatient visits, procedures, consult and ER notes X X X X   See your discharge summary, which is a recap of your hospital and/or ER visit that includes your diagnosis, lab results, and care plan. X X       How to register for Zympi:  1. Go to  [email or web address].  2. Click on the Sign Up Now box, which takes you to the New Member Sign Up page. You will need to provide the following information:  a. Enter your Zympi Access Code exactly as it appears at the top of this page. (You will not need to use this code after you’ve completed the sign-up process. If you do not sign up before the expiration date, you must request a new code.)   b. Enter your date of birth.   c. Enter your home email address.   d. Click Submit, and follow the next screen’s instructions.  3. Create a Zympi ID. This will  be your [employer] login ID and cannot be changed, so think of one that is secure and easy to remember.  4. Create a [employer] password. You can change your password at any time.  5. Enter your Password Reset Question and Answer. This can be used at a later time if you forget your password.   6. Enter your e-mail address. This allows you to receive e-mail notifications when new information is available in [employer].  7. Click Sign Up. You can now view your health information.    For assistance activating your [employer] account, call [phone]

## 2017-01-27 NOTE — ED NOTES
"Pt resting on cart with eyes closed, arouses easily to verbal stimuli. Pt resp even unlabored, skin pink warm dry. Pt states \"pain much better\" at surgical site. Daughter remains at cart side.   "

## 2017-01-27 NOTE — ED NOTES
Pt medicated for pain, fluids running. Daughter with pt. Vitals stable at this time. Pt shows no resp distress.

## 2017-01-27 NOTE — OP REPORT
DATE OF SERVICE:  01/26/2017    PREOPERATIVE DIAGNOSES:  1.  Complex ovarian mass.  2.  Grade II-III cystocele, grade II rectocele, symptomatic.    POSTOPERATIVE DIAGNOSES:  1.  Complex ovarian mass.  2.  Grade II-III cystocele, grade II rectocele, symptomatic.  3.  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    PROCEDURES PERFORMED:  1.  Robotic-assisted bilateral salpingo-oophorectomy.  2.  Anterior and posterior colporrhaphy with perineoplasty.    SURGEON:  Liborio Bearden MD    ASSISTANT:  ROBB Santana    SECOND ASSISTANT:  Antwon Ling MD    ANESTHESIA:  General.    ANESTHESIOLOGIST:  Dane Gonzalez DO    ESTIMATED BLOOD LOSS:  75 mL    FLUIDS:  Per anesthesiologist.    URINE OUTPUT:  As per anesthesiologist.    COMPLICATIONS:  None.    FINAL SPONGE AND NEEDLE COUNTS:  Correct.    INDICATIONS FOR SURGERY:  The patient is a pleasant 76-year-old white female   who was referred to me because of a pelvic floor prolapse.  The patient was   seen by me for consultation.  It was thought that she had a uterine prolapse.    However, on examination, she has a grade III cystocele and a grade II   rectocele.  Upon further evaluation, she was also found to have an adnexal   mass, approximately a 12 cm cystic mass.  The patient was advised to undergo a   surgical pathological evaluation of the adnexal mass.  At the same time, I   recommended that she undergo hysterectomy with removal of both ovaries.    Risks, benefits, and rationale of these procedures were reviewed with the   patient in detail.  Patient is understanding of these risks and wished to   proceed with the surgery as planned.    INTRAOPERATIVE FINDINGS:  1.  Examination under anesthesia again revealed grade III cystocele with no   evidence of uterine prolapse, thus it may be a grade I.  The rectocele was   grade II rectocele, thus noted.  Laparoscopically, she is noted to have no   evidence of ascites, normal right and left diaphragm with the  capsule smooth.    Stomach appeared grossly normal.  Abdominal peritoneal surfaces were   unremarkable.  Omentum appeared grossly normal.  2.  In the pelvis, uterus was normal size.  There was a large cystic mass   emanating from the right ovary.  There was no evidence of excrescences.  It   was free of all adjacent adhesion structure.  Left adnexa was unremarkable.    What was also noted that I did not expect and anticipate was patient clearly   previously had undergone an abdominal sacrocolpopexy.  One can see a mesh that   was tacked on to the left uterosacral ligament and the mesh was tacked on   only to the posterior aspect of the uterosacral ligament and tacked on to the   sacrum.  There were no signs of infection that is noted.  It was well   peritonealize that I can appreciate.    After noting these intraoperative findings, the fact that patient had a grade   III cystocele and only a grade II rectocele and minimal uterine prolapse, I   elected not to remove the uterus as was recommended preoperatively.  The   reason for not removing the uterus is, patient apparently has an abdominal   sacrocolpopexy and performing a hysterectomy would require taking the   abdominal sacrocolpopexy mesh down.  For this reason, I felt that it would be   the best thing for the patient that the uterus be left in place, particularly   in light of the fact that patient is not having any form of abnormal bleeding   and she is not at any risk for potential uterine cancer or cervical cancer in   the future.  Thus, for this reason, we limited our procedure to removal of the   adnexa and repair of the cystocele and the rectocele.    Also of note, at the time of the intraoperative findings, her endopelvic   fascia was extremely weakened.  Thus, I am concerned that just an   anterior-posterior colporrhaphy may not suffice.  I thus anticipate that the   success rate for this repair probably will be about 60% to 80%.  I did not put   a mesh  in mainly because given the patient's age, a mesh could potentially   cause an infection.  With this in mind, we proceeded with the intended   procedure as such.    PROCEDURE NOTE:  The patient was given IV antibiotics prior to procedure.  The   patient was prepped and draped and placed in modified dorsal lithotomy   position.  We proceeded on with the robotic portion of the procedure.    A 1 cm supraumbilical incision was made.  A Veress needle was inserted without   difficulty.  Pneumoperitoneum was achieved to the abdominal pressure of 15   mmHg.  A 12 mm trocar was inserted without difficulty.  After completion of   this, a 12 mm trocar was placed in the left lower quadrant two fingerbreadths   medial to the anterior superior iliac spine under direct laparoscopic   visualization.  After completion of this, a laparoscope was then placed in the   left lower quadrant port to assist in the placement of the remainder of the   da Wilfrid ports.  Two 8 mm ports were placed in the right upper quadrant 8 cm   apart while one 8 mm port was placed in the left upper quadrant 8 cm apart.    After completion of this, the patient was placed in steep Trendelenburg   position.  The robotic system was then docked and after docking the robotic   system, the instrumentation was inserted under direct laparoscopic   visualization to insure that there was no injury to the abdominal contents.    Once this was completed, the robotic camera was then docked.  We then   proceeded with our da Wilfrid portion of the procedure.    At this point in time, I then opened up the right and left posterior broadleaf   ligament.  The ureters were identified.  The ovarian vessels were   skeletonized and isolated, and the ovarian vessels were then subsequently   cauterized with bipolar cautery and a bilateral adnexectomy was performed.    Both the right and left uteroovarian ligaments were skeletonized and isolated   and cauterized with bipolar cautery and  bilateral adnexectomy was achieved.    After completion of this, I then converted the robotic port to a 12 mm port.    The cyst was then placed in an endobag and subsequently zip locked without   spillage and then decompressed and delivered.  Intraoperative analysis   revealed no evidence of excrescences.  This appears benign.  Thus, we did not   proceed with surgical staging.  After completion of this, I then proceeded on   with the vaginal approach.    At this point in time, the pneumoperitoneum was allowed to escape.  The   subcutaneous fat was irrigated with water.  The skin was re-approximated with   3-0 Monocryl sutures after the robotic system was de-docked and robotic   instrumentation was removed, and sponge and needle counts were correct   intraperitoneally.    At this point in time, I then turned my focus towards the perineum.  We   proceed on with the cystocele.    The anterior wall of the vagina was examined.  I made an incision   suburethrally and then the incision was extended all the way back to the   cervix.  I then used the Allis clamps and the bladder was dissected off from   the vaginal mucosa and the attenuated endopelvic fascia was identified.  The   incision that was made in the anterior vaginal wall was nearly about 5 cm.    The bladder was completely dissected away from the endopelvic fascia and the   excess vaginal mucosa was then excised.  I then reapproximated the endopelvic   fascia in an interrupted imbricated fascia with an 0 Vicryl suture,   essentially performing an anterior colporrhaphy.  The vaginal mucosa was then   closed with a 2-0 Vicryl suture in a running layer.  After completion of this,   I then turned my focus towards the posterior rectus seal repair.  Two Allis   clamps were placed in the cornua of the uterus.  A ____ incision was made at   the perineal body.  A midline incision was made in the posterior vaginal   mucosa and using Metzenbaum scissors, the vaginal mucosa  was undermined and   excised.  Again, the posterior endopelvic fascia was well attenuated.  We   essentially dissected off until we were able to achieve a somewhat reasonable   healthy endopelvic fascia laterally.  I then reduced the rectocele and the   endopelvic fascia was then reapproximated with 0 Vicryl suture.  The excess   vaginal mucosa was then subsequently excised.  The perineal body was   reapproximated with 0 Vicryl suture to build the perineal body back.  The   vaginal mucosa was then ____ with a 2-0 Vicryl suture.  Excellent closure was   obtained.  Hemostasis was achieved.  The patient tolerated the procedure well   without any difficulties, and was extubated and transferred to the PACU in   stable condition.       ____________________________________     BRYN DE ANDA MD    PCL / NTS    DD:  01/27/2017 11:40:03  DT:  01/27/2017 14:35:14    D#:  [id number]  Job#:  [id number]    cc: ROSEANNA FLOWERS MD

## 2017-01-27 NOTE — OR NURSING
Pt. unable to wean off O2, using IS and sitting upright. Pt states that she is unable to clear her secretions and trying to clear secretions frequently, drinking cold and hot fluids to help mobilize secretions without success. This RN attempted to suction secretions without success. Dr. Bearden notified and RT protocol ordered. RT at bedside and Dr. Bearden to come evaluate patient.

## 2017-01-27 NOTE — IP AVS SNAPSHOT
" <p align=\"LEFT\"><IMG SRC=\"//[email or web address]$/[email or web address]\" alt=\"Image\" WIDTH=\"50%\" HEIGHT=\"200\" BORDER=\"\"></p>                   Name:Stephanie Guerrero  Medical Record Number:[id number]  CSN: [id number]    YOB: 1940   Age: 76 y.o.  Sex: female  HT:1.575 m (5' 2\") WT: 53.4 kg (117 lb 11.6 oz)          Admit Date: 1/27/2017     Discharge Date:   Today's Date: 2/2/2017  Attending Doctor:  Gabby Corbett D.O.                  Allergies:  Enalapril          Your appointments     Feb 14, 2017 12:40 PM   HOSPITAL FOLLOW UP with LUIS Littlejohn   [facility] (--)    [address], [facility] [address]  [place] [id number] [phone]              Follow-up Information     1. Follow up with Alan Singh M.D. In 2 weeks.    Specialty:  Internal Medicine    Contact information    [address] [facility] [address]  [place] [address] [phone]          2. Follow up with Liborio Bearden M.D. In 2 weeks.    Specialty:  Gynecologic Oncology    Contact information    [address] [facility] [place] [id number] [phone]           Medication List      Take these Medications        Instructions    ALEVE 220 MG Caps   Generic drug:  Naproxen Sodium    Take 1 Cap by mouth 1 time daily as needed.   Dose:  1 Cap       aspirin 81 MG Chew chewable tablet   Commonly known as:  ASA    Take 1 Tab by mouth every day.   Dose:  81 mg       Chromium 1 MG Caps    Take 1 Cap by mouth every day.   Dose:  1 Cap       CLARITIN PO    Take 1 Tab by mouth every day.   Dose:  1 Tab       CO Q 10 PO    Take 1 Tab by mouth every day.   Dose:  1 Tab       ECHINACEA GOLDENSEAL PLUS PO    Take 1 Each by mouth 1 time daily as needed. tincture   Dose:  1 Each       L-GLUTAMINE PO    Take 1 Tab by mouth every day.   Dose:  1 Tab       metoclopramide 10 MG Tabs   Commonly known as:  REGLAN    Take 1 Tab by mouth every 6 hours as needed.   Dose:  10 mg       montelukast 10 MG Tabs   Commonly known as:  SINGULAIR    Take 10 mg by mouth " every day.   Dose:  10 mg       multivitamin Tabs    Take 1 Tab by mouth every day.   Dose:  1 Tab       OMEGA 3 PO    Take 1 Tab by mouth every day.   Dose:  1 Tab       ondansetron 4 MG Tabs tablet   Commonly known as:  ZOFRAN    Take 1 Tab by mouth every 6 hours as needed.   Dose:  4 mg       oxycodone-acetaminophen 5-325 MG Tabs   Commonly known as:  PERCOCET    Take 1 Tab by mouth every four hours as needed.   Dose:  1 Tab       GIAN D ARCO PO    Take 1 Each by mouth 3 times a day. Tincture   Dose:  1 Each       Potassium 99 MG Tabs    Take 1 Tab by mouth every day.   Dose:  1 Tab       predniSONE 10 MG Tabs   Commonly known as:  DELTASONE    40mg po qday x 3 days then  30 mg po qday x 3 days 20 mp po qday x 3 days  10 mp po qday x 3 days       PROAIR  (90 BASE) MCG/ACT Aers inhalation aerosol   Generic drug:  albuterol    Inhale 2 Puffs by mouth every 6 hours as needed for Shortness of Breath.   Dose:  2 Puff       PROBIOTIC PO    Take 1 Tab by mouth every day.   Dose:  1 Tab       sodium chloride 1 GM Tabs   Commonly known as:  SALT    Take 1 Tab by mouth 3 times a day, with meals for 14 days.   Dose:  1 g       VITAMIN D-3 PO    Take 4,000 Units by mouth every day.   Dose:  4000 Units

## 2017-01-27 NOTE — IP AVS SNAPSHOT
2/2/2017          Stephanie Guerrero  [address]  [place] [address]    Dear Stephanie:    [facility] wants to ensure your discharge home is safe and you or your loved ones have had all your questions answered regarding your care after you leave the hospital.    You may receive a telephone call within two days of your discharge.  This call is to make certain you understand your discharge instructions as well as ensure we provided you with the best care possible during your stay with us.     The call will only last approximately 3-5 minutes and will be done by a nurse.    Once again, we want to ensure your discharge home is safe and that you have a clear understanding of any next steps in your care.  If you have any questions or concerns, please do not hesitate to contact us, we are here for you.  Thank you for choosing [facility] for your healthcare needs.    Sincerely,    Samy Dutton    [facility]

## 2017-01-28 ENCOUNTER — APPOINTMENT (OUTPATIENT)
Dept: RADIOLOGY | Facility: MEDICAL CENTER | Age: 77
DRG: 205 | End: 2017-01-28
Attending: SPECIALIST
Payer: MEDICARE

## 2017-01-28 LAB
ANION GAP SERPL CALC-SCNC: 5 MMOL/L (ref 0–11.9)
BASOPHILS # BLD AUTO: 0.1 % (ref 0–1.8)
BASOPHILS # BLD: 0.01 K/UL (ref 0–0.12)
BNP SERPL-MCNC: 483 PG/ML (ref 0–100)
BUN SERPL-MCNC: 5 MG/DL (ref 8–22)
CALCIUM SERPL-MCNC: 7.6 MG/DL (ref 8.5–10.5)
CHLORIDE SERPL-SCNC: 88 MMOL/L (ref 96–112)
CO2 SERPL-SCNC: 28 MMOL/L (ref 20–33)
CREAT SERPL-MCNC: 0.46 MG/DL (ref 0.5–1.4)
EKG IMPRESSION: NORMAL
EOSINOPHIL # BLD AUTO: 0.02 K/UL (ref 0–0.51)
EOSINOPHIL NFR BLD: 0.1 % (ref 0–6.9)
ERYTHROCYTE [DISTWIDTH] IN BLOOD BY AUTOMATED COUNT: 43.9 FL (ref 35.9–50)
GFR SERPL CREATININE-BSD FRML MDRD: >60 ML/MIN/1.73 M 2
GLUCOSE SERPL-MCNC: 142 MG/DL (ref 65–99)
HCT VFR BLD AUTO: 30.9 % (ref 37–47)
HGB BLD-MCNC: 10 G/DL (ref 12–16)
IMM GRANULOCYTES # BLD AUTO: 0.04 K/UL (ref 0–0.11)
IMM GRANULOCYTES NFR BLD AUTO: 0.3 % (ref 0–0.9)
LYMPHOCYTES # BLD AUTO: 0.65 K/UL (ref 1–4.8)
LYMPHOCYTES NFR BLD: 4.8 % (ref 22–41)
MCH RBC QN AUTO: 30 PG (ref 27–33)
MCHC RBC AUTO-ENTMCNC: 32.4 G/DL (ref 33.6–35)
MCV RBC AUTO: 92.8 FL (ref 81.4–97.8)
MONOCYTES # BLD AUTO: 1.83 K/UL (ref 0–0.85)
MONOCYTES NFR BLD AUTO: 13.4 % (ref 0–13.4)
NEUTROPHILS # BLD AUTO: 11.12 K/UL (ref 2–7.15)
NEUTROPHILS NFR BLD: 81.3 % (ref 44–72)
NRBC # BLD AUTO: 0 K/UL
NRBC BLD AUTO-RTO: 0 /100 WBC
PLATELET # BLD AUTO: 180 K/UL (ref 164–446)
PMV BLD AUTO: 9.8 FL (ref 9–12.9)
POTASSIUM SERPL-SCNC: 3.8 MMOL/L (ref 3.6–5.5)
RBC # BLD AUTO: 3.33 M/UL (ref 4.2–5.4)
SODIUM SERPL-SCNC: 121 MMOL/L (ref 135–145)
TROPONIN I SERPL-MCNC: 0.07 NG/ML (ref 0–0.04)
TROPONIN I SERPL-MCNC: 0.08 NG/ML (ref 0–0.04)
WBC # BLD AUTO: 13.7 K/UL (ref 4.8–10.8)

## 2017-01-28 PROCEDURE — 93010 ELECTROCARDIOGRAM REPORT: CPT | Performed by: INTERNAL MEDICINE

## 2017-01-28 PROCEDURE — 93005 ELECTROCARDIOGRAM TRACING: CPT | Performed by: SPECIALIST

## 2017-01-28 PROCEDURE — 84484 ASSAY OF TROPONIN QUANT: CPT

## 2017-01-28 PROCEDURE — 36415 COLL VENOUS BLD VENIPUNCTURE: CPT

## 2017-01-28 PROCEDURE — 700102 HCHG RX REV CODE 250 W/ 637 OVERRIDE(OP): Performed by: SPECIALIST

## 2017-01-28 PROCEDURE — G0378 HOSPITAL OBSERVATION PER HR: HCPCS

## 2017-01-28 PROCEDURE — A9270 NON-COVERED ITEM OR SERVICE: HCPCS | Performed by: SPECIALIST

## 2017-01-28 PROCEDURE — 85025 COMPLETE CBC W/AUTO DIFF WBC: CPT

## 2017-01-28 PROCEDURE — 99215 OFFICE O/P EST HI 40 MIN: CPT | Performed by: INTERNAL MEDICINE

## 2017-01-28 PROCEDURE — 700111 HCHG RX REV CODE 636 W/ 250 OVERRIDE (IP): Performed by: SPECIALIST

## 2017-01-28 PROCEDURE — 71020 DX-CHEST-2 VIEWS: CPT

## 2017-01-28 PROCEDURE — 700105 HCHG RX REV CODE 258: Performed by: SPECIALIST

## 2017-01-28 PROCEDURE — 83880 ASSAY OF NATRIURETIC PEPTIDE: CPT

## 2017-01-28 PROCEDURE — 700101 HCHG RX REV CODE 250: Performed by: INTERNAL MEDICINE

## 2017-01-28 PROCEDURE — 80048 BASIC METABOLIC PNL TOTAL CA: CPT

## 2017-01-28 PROCEDURE — 700101 HCHG RX REV CODE 250: Performed by: SPECIALIST

## 2017-01-28 RX ORDER — POTASSIUM CHLORIDE 20 MEQ/1
40 TABLET, EXTENDED RELEASE ORAL DAILY
Status: DISCONTINUED | OUTPATIENT
Start: 2017-01-29 | End: 2017-01-28

## 2017-01-28 RX ORDER — POTASSIUM CHLORIDE 20 MEQ/1
40 TABLET, EXTENDED RELEASE ORAL DAILY
Status: DISCONTINUED | OUTPATIENT
Start: 2017-01-29 | End: 2017-02-02 | Stop reason: HOSPADM

## 2017-01-28 RX ORDER — FUROSEMIDE 40 MG/1
40 TABLET ORAL
Status: DISCONTINUED | OUTPATIENT
Start: 2017-01-29 | End: 2017-01-29

## 2017-01-28 RX ORDER — CALCIUM CARBONATE 500 MG/1
500 TABLET, CHEWABLE ORAL EVERY 8 HOURS PRN
Status: DISCONTINUED | OUTPATIENT
Start: 2017-01-28 | End: 2017-02-02 | Stop reason: HOSPADM

## 2017-01-28 RX ORDER — DEXTROSE MONOHYDRATE, SODIUM CHLORIDE, AND POTASSIUM CHLORIDE 50; 1.49; 4.5 G/1000ML; G/1000ML; G/1000ML
INJECTION, SOLUTION INTRAVENOUS CONTINUOUS
Status: DISCONTINUED | OUTPATIENT
Start: 2017-01-28 | End: 2017-01-28

## 2017-01-28 RX ORDER — SODIUM CHLORIDE 1 G/1
1 TABLET ORAL
Status: DISCONTINUED | OUTPATIENT
Start: 2017-01-28 | End: 2017-02-02 | Stop reason: HOSPADM

## 2017-01-28 RX ORDER — PREDNISONE 50 MG/1
50 TABLET ORAL DAILY
Status: DISCONTINUED | OUTPATIENT
Start: 2017-01-29 | End: 2017-02-02 | Stop reason: HOSPADM

## 2017-01-28 RX ADMIN — CEFTRIAXONE 2 G: 2 INJECTION, POWDER, FOR SOLUTION INTRAMUSCULAR; INTRAVENOUS at 09:51

## 2017-01-28 RX ADMIN — SODIUM CHLORIDE TAB 1 GM 1 G: 1 TAB at 19:41

## 2017-01-28 RX ADMIN — FAMOTIDINE 20 MG: 10 INJECTION INTRAVENOUS at 08:19

## 2017-01-28 RX ADMIN — MONTELUKAST SODIUM 10 MG: 10 TABLET, FILM COATED ORAL at 19:41

## 2017-01-28 RX ADMIN — NAPROXEN 250 MG: 250 TABLET ORAL at 09:51

## 2017-01-28 RX ADMIN — SODIUM CHLORIDE: 9 INJECTION, SOLUTION INTRAVENOUS at 14:23

## 2017-01-28 RX ADMIN — METRONIDAZOLE 500 MG: 500 INJECTION, SOLUTION INTRAVENOUS at 22:09

## 2017-01-28 RX ADMIN — LORAZEPAM 0.25 MG: 1 TABLET ORAL at 14:56

## 2017-01-28 RX ADMIN — HYDROCODONE BITARTRATE AND ACETAMINOPHEN 15 ML: 2.5; 108 SOLUTION ORAL at 08:19

## 2017-01-28 RX ADMIN — POTASSIUM CHLORIDE, DEXTROSE MONOHYDRATE AND SODIUM CHLORIDE: 150; 5; 450 INJECTION, SOLUTION INTRAVENOUS at 00:35

## 2017-01-28 RX ADMIN — HYDROCODONE BITARTRATE AND ACETAMINOPHEN 15 ML: 2.5; 108 SOLUTION ORAL at 00:43

## 2017-01-28 RX ADMIN — ONDANSETRON 4 MG: 2 INJECTION, SOLUTION INTRAMUSCULAR; INTRAVENOUS at 08:19

## 2017-01-28 RX ADMIN — ALBUTEROL SULFATE 2 PUFF: 90 AEROSOL, METERED RESPIRATORY (INHALATION) at 08:33

## 2017-01-28 ASSESSMENT — PAIN SCALES - GENERAL
PAINLEVEL_OUTOF10: 5
PAINLEVEL_OUTOF10: 4
PAINLEVEL_OUTOF10: 2
PAINLEVEL_OUTOF10: 8
PAINLEVEL_OUTOF10: 4
PAINLEVEL_OUTOF10: 2

## 2017-01-28 NOTE — CARE PLAN
Problem: Communication  Goal: The ability to communicate needs accurately and effectively will improve  Patient communicates needs appropriately to nursing staff. Patient calls appropriately.    Problem: Safety  Goal: Will remain free from falls  Room/floor clear. Non skid socks on. Proper signs up. Bed alarm on. Bed low and locked. Call light and belonging within reach. Hourly rounding in place.

## 2017-01-28 NOTE — PROGRESS NOTES
"Pt very anxious states \"I can't breathe my throat is closing\". SaO2 @ 98% on 1L nasal cannula. Increased oxygen to 3L for comfort.   RRT paged.   Dr. Bearden paged as well.  "

## 2017-01-28 NOTE — PROGRESS NOTES
"RT in to assess pt. Pt refusing oxygen mask \"I am too claustrophobic for that\". Pt agrees to hold mask in front of face for mist oxygen.   Pt respiratory status improving, not so labored.   "

## 2017-01-28 NOTE — PROGRESS NOTES
BS report received. Pt sitting up in bed, alert and oriented x4. No c/o pain, nausea/ vomiting or SOB. Pt's breathing with moderate efforts, expiratory wheeze audible. Pt on 12L oxygen via mist mask, SpO2 98% at this time.  in use.  IVF running at 75mL/hr. Painting to gravity with clear yellow urine. POC discussed, verbalized understanding.     Bed low and locked, bed alarm not on. Pt refused bed alarm, educated pt regarding safety risk and continues to refuse. Pt agrees to call before getting out of bed. Call light and personal belongings within reach. Hourly rounding in place.

## 2017-01-28 NOTE — H&P
CHIEF COMPLAINT:  Shortness of breath and difficulty breathing.    HISTORY OF PRESENTING COMPLAINT:  The patient is a 76-year-old who presented   to the emergency room overnight.  She was discharged from the hospital   yesterday after a robotic assisted bilateral salpingo-oophorectomy and   anterior and posterior vaginal repair.  The patient was feeling some chest   discomfort, complaining of phlegm in her throat yesterday night from 10 p.m.   until 2 or 3 in the morning at which point when she work up in the middle of   night, she had increasing difficulty and shortness of breath and called 911.    She presented to the ER and her symptoms continued.    PAST MEDICAL HISTORY:  Type 2 diabetes, sinusitis, bronchitis, asthma, hiatus   hernia, bulbar polio.    PREVIOUS SURGICAL HISTORY:  Tonsillectomy, cataract extraction, anterior and   posterior repair and robotic bilateral salpingo-oophorectomy.    SOCIAL HISTORY:  The patient is a former smoker.  Occasional alcohol, no drug   use.    FAMILY HISTORY:  Noncontributory.    MEDICATIONS:  See EPIC chart.  Include montelukast, Naproxen, akinesia, Leticia   d'arco p.o., Oxycodone 5/325, metoclopramide 10 mg as needed, Zofran 4 mg   tablets, multivitamin, potassium, chromium, glucosamine, ProAir  90   base 2 puffs every 6 hours as needed for shortness of breath, vitamin D,   coenzyme Q10, probiotic omega 3 fatty acid, loratadine.    ALLERGIES:  ENALAPRIL.    PHYSICAL EXAMINATION:  VITAL SIGNS:  The patient's blood pressure is 120/69, pulse rate is 92,   temperature is 36.6, respiratory rate 22, BMI is 23.2, SpO2 96% on 2 L.  GENERAL:  The patient is well developed, well nourished and anxious.  HEENT:  The patient is normocephalic and atraumatic.  Oropharynx is clear and   moist without exudate or erythema.  Eyes, pupils are equal ____ and reactive   to light.  Conjunctivae are normal.  CARDIOVASCULAR:  S1, S2 plus 0.  PULMONARY:  No wheezes or rales.  Some crackles on  the right lower bases.  ABDOMEN:  Soft and nontender with no distension.  Well-healed incision signs.    The dressings were removed which were clean, dry and intact.  MUSCULOSKELETAL:  The patient has no lower extremity edema.  No evidence of   any DVT.  SKIN:  Warm and dry with no rash.  PSYCHIATRIC:  The patient is anxious but in normal mood and affect.    ASSESSMENT AND PLAN:  The patient is 76-year-old with shortness of breath   within 24 hours of anesthesia and surgery.  1.  Shortness of breath.  Chest x-ray today.  Treat with ceftriaxone for   likely aspiration pneumonia.  Continue O2 per protocol and wean off O2.    Monitor temperature curve.  Monitor CBC.  2.  Leukocytosis.  Likely secondary to surgery.  Repeat in a.m. antibiotics   for aspiration pneumonia.  3.  Hyponatremia.  Replete with sodium.  Replete lytes in a.m.       ____________________________________     Antwon Ling M.D.    RAC / RAYA    DD:  01/27/2017 19:32:50  DT:  01/27/2017 20:44:01    D#:  [id number]  Job#:  [id number]

## 2017-01-28 NOTE — PROGRESS NOTES
Received pt from ER via stretcher. Pt transferred to bed with 1 x assist.   Pt's breathing laboured. Expiratory wheeze audible. Pt SaO2 @ 98% on 1L nasal cannula.  SL to RAC patent, initiated IV as ordered.  Pt had surgery yesterday (removal of cyst of bilateral ovaries and bladder repair). Painting insitu draining clear yellow urine. Small sanguinous shadowing on pad.  Lap sites x 4 with guaze and transparent tape clean, dry and intact.  Pt settled with call bell in reach.

## 2017-01-28 NOTE — H&P
CHIEF COMPLAINT:  Shortness of breath and difficulty breathing.    HISTORY OF PRESENTING COMPLAINT:  The patient is a 76-year-old who presented   to the emergency room overnight.  She was discharged from the hospital   yesterday after a robotic assisted bilateral salpingo-oophorectomy and   anterior and posterior vaginal repair.  The patient was feeling some chest   discomfort, complaining of phlegm in her throat yesterday night from 10 p.m.   until 2 or 3 in the morning at which point when she work up in the middle of   night, she had increasing difficulty and shortness of breath and called 911.    She presented to the ER and her symptoms continued.    PAST MEDICAL HISTORY:  Type 2 diabetes, sinusitis, bronchitis, asthma, hiatus   hernia, bulbar polio.    PREVIOUS SURGICAL HISTORY:  Tonsillectomy, cataract extraction, anterior and   posterior repair and robotic bilateral salpingo-oophorectomy.    SOCIAL HISTORY:  The patient is a former smoker.  Occasional alcohol, no drug   use.    FAMILY HISTORY:  Noncontributory.    MEDICATIONS:  See EPIC chart.  Include montelukast, Naproxen, akinesia, Leticia   d'arco p.o., Oxycodone 5/325, metoclopramide 10 mg as needed, Zofran 4 mg   tablets, multivitamin, potassium, chromium, glucosamine, ProAir  90   base 2 puffs every 6 hours as needed for shortness of breath, vitamin D,   coenzyme Q10, probiotic omega 3 fatty acid, loratadine.    ALLERGIES:  ENALAPRIL.    PHYSICAL EXAMINATION:  VITAL SIGNS:  The patient's blood pressure is 120/69, pulse rate is 92,   temperature is 36.6, respiratory rate 22, BMI is 23.2, SpO2 96% on 2 L.  GENERAL:  The patient is well developed, well nourished and anxious.  HEENT:  The patient is normocephalic and atraumatic.  Oropharynx is clear and   moist without exudate or erythema.  Eyes, pupils are equal ____ and reactive   to light.  Conjunctivae are normal.  CARDIOVASCULAR:  S1, S2 plus 0.  PULMONARY:  No wheezes or rales.  Some crackles on  the right lower bases.  ABDOMEN:  Soft and nontender with no distension.  Well-healed incision signs.    The dressings were removed which were clean, dry and intact.  MUSCULOSKELETAL:  The patient has no lower extremity edema.  No evidence of   any DVT.  SKIN:  Warm and dry with no rash.  PSYCHIATRIC:  The patient is anxious but in normal mood and affect.    ASSESSMENT AND PLAN:  The patient is 76-year-old with shortness of breath   within 24 hours of anesthesia and surgery.  1.  Shortness of breath.  Chest x-ray today.  Treat with ceftriaxone for   likely aspiration pneumonia.  Continue O2 per protocol and wean off O2.    Monitor temperature curve.  Monitor CBC.  2.  Leukocytosis.  Likely secondary to surgery.  Repeat in a.m. antibiotics   for aspiration pneumonia.  3.  Hyponatremia.  Replete with sodium.  Replete lytes in a.m.       ____________________________________     Antwon Ling M.D.    RAC / RAYA    DD:  01/27/2017 19:32:50  DT:  01/27/2017 20:44:01    D#:  [id number]  Job#:  [id number]

## 2017-01-28 NOTE — PROGRESS NOTES
Spoke with pt's daughter regarding pt's status. Daughter has resumed care for pt post-operatively and is experiencing caregiver burn out. Daughter believes she would benefit from home health services until pt can return to baseline. Will pass on to the next shift.

## 2017-01-28 NOTE — PROGRESS NOTES
Pt agrees to take Ativan 0.25mg PO for anxiety. Pt has difficulty swallowing medication d/t respiratory distress but able to take medication down.

## 2017-01-28 NOTE — PROGRESS NOTES
Pt with 5/10 pelvic pain when getting out of bed, able to stand with assistance. PRN pain and nausea meds give, albuterol given x 1 for SOB. 98%m on 12L humidified blow by.

## 2017-01-28 NOTE — CARE PLAN
Problem: Safety  Goal: Will remain free from injury  Bed locked and in lowest position with call bell in reach. Pt aware to use call bell when needing assistance.

## 2017-01-28 NOTE — CARE PLAN
Problem: Pain Management  Goal: Pain level will decrease to patient’s comfort goal  Pt experiencing pain to perineum from surgery. Will call MD for pain medication.

## 2017-01-28 NOTE — PROGRESS NOTES
Pt resting comfortably, blow by oxygen @ 12L. Cayla- pad with some sanguinous drainage.  Ate breakfast from home, daughter at bedside. Will continue to monitor.

## 2017-01-29 ENCOUNTER — APPOINTMENT (OUTPATIENT)
Dept: RADIOLOGY | Facility: MEDICAL CENTER | Age: 77
DRG: 205 | End: 2017-01-29
Attending: HOSPITALIST
Payer: MEDICARE

## 2017-01-29 PROBLEM — D27.0 CYSTADENOMA OF RIGHT OVARY: Status: ACTIVE | Noted: 2017-01-29

## 2017-01-29 PROBLEM — E87.1 HYPONATREMIA: Status: ACTIVE | Noted: 2017-01-29

## 2017-01-29 PROBLEM — J96.01 ACUTE RESPIRATORY FAILURE WITH HYPOXIA (HCC): Status: ACTIVE | Noted: 2017-01-29

## 2017-01-29 PROBLEM — J45.909 ASTHMA: Status: ACTIVE | Noted: 2017-01-29

## 2017-01-29 PROBLEM — E87.6 HYPOKALEMIA: Status: ACTIVE | Noted: 2017-01-29

## 2017-01-29 PROBLEM — R79.89 ELEVATED TROPONIN: Status: ACTIVE | Noted: 2017-01-29

## 2017-01-29 PROBLEM — C56.1: Status: ACTIVE | Noted: 2017-01-29

## 2017-01-29 LAB
ANION GAP SERPL CALC-SCNC: 6 MMOL/L (ref 0–11.9)
BUN SERPL-MCNC: 4 MG/DL (ref 8–22)
CALCIUM SERPL-MCNC: 8.2 MG/DL (ref 8.5–10.5)
CHLORIDE SERPL-SCNC: 83 MMOL/L (ref 96–112)
CO2 SERPL-SCNC: 32 MMOL/L (ref 20–33)
CREAT SERPL-MCNC: 0.41 MG/DL (ref 0.5–1.4)
GFR SERPL CREATININE-BSD FRML MDRD: >60 ML/MIN/1.73 M 2
GLUCOSE SERPL-MCNC: 111 MG/DL (ref 65–99)
LV EJECT FRACT  99904: 65
POTASSIUM SERPL-SCNC: 3.4 MMOL/L (ref 3.6–5.5)
SODIUM SERPL-SCNC: 121 MMOL/L (ref 135–145)
TROPONIN I SERPL-MCNC: 0.1 NG/ML (ref 0–0.04)
TROPONIN I SERPL-MCNC: 0.17 NG/ML (ref 0–0.04)

## 2017-01-29 PROCEDURE — A9270 NON-COVERED ITEM OR SERVICE: HCPCS | Performed by: HOSPITALIST

## 2017-01-29 PROCEDURE — 700101 HCHG RX REV CODE 250: Performed by: INTERNAL MEDICINE

## 2017-01-29 PROCEDURE — A9270 NON-COVERED ITEM OR SERVICE: HCPCS | Performed by: INTERNAL MEDICINE

## 2017-01-29 PROCEDURE — 700102 HCHG RX REV CODE 250 W/ 637 OVERRIDE(OP): Performed by: INTERNAL MEDICINE

## 2017-01-29 PROCEDURE — 85025 COMPLETE CBC W/AUTO DIFF WBC: CPT

## 2017-01-29 PROCEDURE — 700111 HCHG RX REV CODE 636 W/ 250 OVERRIDE (IP): Performed by: SPECIALIST

## 2017-01-29 PROCEDURE — 99204 OFFICE O/P NEW MOD 45 MIN: CPT | Mod: 25 | Performed by: INTERNAL MEDICINE

## 2017-01-29 PROCEDURE — 80048 BASIC METABOLIC PNL TOTAL CA: CPT | Mod: 91

## 2017-01-29 PROCEDURE — 700105 HCHG RX REV CODE 258: Performed by: SPECIALIST

## 2017-01-29 PROCEDURE — G0378 HOSPITAL OBSERVATION PER HR: HCPCS

## 2017-01-29 PROCEDURE — 83735 ASSAY OF MAGNESIUM: CPT

## 2017-01-29 PROCEDURE — 700111 HCHG RX REV CODE 636 W/ 250 OVERRIDE (IP): Performed by: INTERNAL MEDICINE

## 2017-01-29 PROCEDURE — 93306 TTE W/DOPPLER COMPLETE: CPT | Mod: 26 | Performed by: INTERNAL MEDICINE

## 2017-01-29 PROCEDURE — 700111 HCHG RX REV CODE 636 W/ 250 OVERRIDE (IP): Performed by: FAMILY MEDICINE

## 2017-01-29 PROCEDURE — A9270 NON-COVERED ITEM OR SERVICE: HCPCS | Performed by: SPECIALIST

## 2017-01-29 PROCEDURE — 71275 CT ANGIOGRAPHY CHEST: CPT

## 2017-01-29 PROCEDURE — 36415 COLL VENOUS BLD VENIPUNCTURE: CPT

## 2017-01-29 PROCEDURE — 99226 PR SUBSEQUENT OBSERVATION CARE,LEVEL III: CPT | Performed by: HOSPITALIST

## 2017-01-29 PROCEDURE — 84484 ASSAY OF TROPONIN QUANT: CPT | Mod: 91

## 2017-01-29 PROCEDURE — 700105 HCHG RX REV CODE 258

## 2017-01-29 PROCEDURE — 700102 HCHG RX REV CODE 250 W/ 637 OVERRIDE(OP): Performed by: HOSPITALIST

## 2017-01-29 PROCEDURE — 700102 HCHG RX REV CODE 250 W/ 637 OVERRIDE(OP): Performed by: SPECIALIST

## 2017-01-29 PROCEDURE — 93306 TTE W/DOPPLER COMPLETE: CPT

## 2017-01-29 RX ORDER — FUROSEMIDE 20 MG/1
20 TABLET ORAL
Status: DISCONTINUED | OUTPATIENT
Start: 2017-01-30 | End: 2017-02-02 | Stop reason: HOSPADM

## 2017-01-29 RX ORDER — POTASSIUM CHLORIDE 750 MG/1
40 TABLET, FILM COATED, EXTENDED RELEASE ORAL ONCE
Status: COMPLETED | OUTPATIENT
Start: 2017-01-29 | End: 2017-01-29

## 2017-01-29 RX ORDER — GUAIFENESIN 600 MG/1
600 TABLET, EXTENDED RELEASE ORAL EVERY 12 HOURS
Status: DISCONTINUED | OUTPATIENT
Start: 2017-01-29 | End: 2017-02-02 | Stop reason: HOSPADM

## 2017-01-29 RX ORDER — LORATADINE 10 MG/1
10 TABLET ORAL DAILY
Status: DISCONTINUED | OUTPATIENT
Start: 2017-01-30 | End: 2017-02-02 | Stop reason: HOSPADM

## 2017-01-29 RX ORDER — LABETALOL HYDROCHLORIDE 5 MG/ML
10 INJECTION, SOLUTION INTRAVENOUS EVERY 4 HOURS PRN
Status: DISCONTINUED | OUTPATIENT
Start: 2017-01-29 | End: 2017-02-02 | Stop reason: HOSPADM

## 2017-01-29 RX ORDER — ASPIRIN 81 MG/1
81 TABLET, CHEWABLE ORAL DAILY
Status: DISCONTINUED | OUTPATIENT
Start: 2017-01-30 | End: 2017-02-02 | Stop reason: HOSPADM

## 2017-01-29 RX ORDER — POTASSIUM CHLORIDE 20 MEQ/1
20 TABLET, EXTENDED RELEASE ORAL ONCE
Status: COMPLETED | OUTPATIENT
Start: 2017-01-29 | End: 2017-01-29

## 2017-01-29 RX ORDER — SODIUM CHLORIDE 9 MG/ML
INJECTION, SOLUTION INTRAVENOUS
Status: COMPLETED
Start: 2017-01-29 | End: 2017-01-29

## 2017-01-29 RX ORDER — LORATADINE 10 MG/1
5 TABLET ORAL ONCE
Status: COMPLETED | OUTPATIENT
Start: 2017-01-29 | End: 2017-01-29

## 2017-01-29 RX ADMIN — ENOXAPARIN SODIUM 60 MG: 100 INJECTION SUBCUTANEOUS at 17:47

## 2017-01-29 RX ADMIN — GUAIFENESIN 600 MG: 600 TABLET, EXTENDED RELEASE ORAL at 11:37

## 2017-01-29 RX ADMIN — NAPROXEN 250 MG: 250 TABLET ORAL at 06:55

## 2017-01-29 RX ADMIN — ENOXAPARIN SODIUM 60 MG: 100 INJECTION SUBCUTANEOUS at 05:04

## 2017-01-29 RX ADMIN — METRONIDAZOLE 500 MG: 500 INJECTION, SOLUTION INTRAVENOUS at 05:00

## 2017-01-29 RX ADMIN — SODIUM CHLORIDE TAB 1 GM 1 G: 1 TAB at 16:00

## 2017-01-29 RX ADMIN — METRONIDAZOLE 500 MG: 500 INJECTION, SOLUTION INTRAVENOUS at 21:20

## 2017-01-29 RX ADMIN — POTASSIUM CHLORIDE 20 MEQ: 1500 TABLET, EXTENDED RELEASE ORAL at 09:10

## 2017-01-29 RX ADMIN — METOPROLOL TARTRATE 12.5 MG: 25 TABLET, FILM COATED ORAL at 21:15

## 2017-01-29 RX ADMIN — POTASSIUM CHLORIDE 40 MEQ: 1500 TABLET, EXTENDED RELEASE ORAL at 08:59

## 2017-01-29 RX ADMIN — FUROSEMIDE 40 MG: 20 TABLET ORAL at 09:01

## 2017-01-29 RX ADMIN — OMEGA-3 FATTY ACIDS CAP 1000 MG 1000 MG: 1000 CAP at 09:00

## 2017-01-29 RX ADMIN — LORATADINE 5 MG: 10 TABLET ORAL at 11:36

## 2017-01-29 RX ADMIN — POTASSIUM CHLORIDE 40 MEQ: 750 TABLET, FILM COATED, EXTENDED RELEASE ORAL at 11:37

## 2017-01-29 RX ADMIN — METRONIDAZOLE 500 MG: 500 INJECTION, SOLUTION INTRAVENOUS at 13:47

## 2017-01-29 RX ADMIN — SODIUM CHLORIDE TAB 1 GM 1 G: 1 TAB at 11:37

## 2017-01-29 RX ADMIN — THERA TABS 1 TABLET: TAB at 09:00

## 2017-01-29 RX ADMIN — VITAMIN D, TAB 1000IU (100/BT) 4000 UNITS: 25 TAB at 09:00

## 2017-01-29 RX ADMIN — Medication 30 MG: at 09:03

## 2017-01-29 RX ADMIN — MONTELUKAST SODIUM 10 MG: 10 TABLET, FILM COATED ORAL at 16:00

## 2017-01-29 RX ADMIN — LORATADINE 5 MG: 10 TABLET ORAL at 09:01

## 2017-01-29 RX ADMIN — OXYCODONE HYDROCHLORIDE AND ACETAMINOPHEN 1 TABLET: 5; 325 TABLET ORAL at 20:42

## 2017-01-29 RX ADMIN — GUAIFENESIN 600 MG: 600 TABLET, EXTENDED RELEASE ORAL at 20:42

## 2017-01-29 RX ADMIN — SODIUM CHLORIDE 500 ML: 9 INJECTION, SOLUTION INTRAVENOUS at 01:30

## 2017-01-29 RX ADMIN — CEFTRIAXONE 2 G: 2 INJECTION, POWDER, FOR SOLUTION INTRAMUSCULAR; INTRAVENOUS at 09:03

## 2017-01-29 RX ADMIN — SODIUM CHLORIDE TAB 1 GM 1 G: 1 TAB at 09:00

## 2017-01-29 RX ADMIN — PREDNISONE 50 MG: 50 TABLET ORAL at 09:00

## 2017-01-29 ASSESSMENT — PAIN SCALES - GENERAL
PAINLEVEL_OUTOF10: 2
PAINLEVEL_OUTOF10: 6

## 2017-01-29 ASSESSMENT — ENCOUNTER SYMPTOMS
ABDOMINAL PAIN: 1
CONSTIPATION: 0
CHILLS: 0
COUGH: 0
VOMITING: 0
WHEEZING: 0
NAUSEA: 0
FEVER: 0
SHORTNESS OF BREATH: 1
DIARRHEA: 0
NERVOUS/ANXIOUS: 1
ABDOMINAL PAIN: 0

## 2017-01-29 NOTE — CONSULTS
DATE OF SERVICE:  01/28/2017    REQUESTING PHYSICIAN:  Dr. Liborio Bearden    INDICATIONS: Hyponatremia and respiratory distress.    HISTORY OF PRESENT ILLNESS:  This is a 76-year-old female that underwent a   laparoscopic resection of a complex ovarian mass with repair of cystocele and   rectocele, and benign serous cystadenoma of the right ovary resection by Dr. Liborio Bearden on 01/26/2017.  Postop, she was a difficult discharge from a   respiratory standpoint.  Due to respiratory complaints, she came back the next   morning complaining of inability to manage her secretions in her throat,   cough, shortness of breath.  She called 911 at home.    She was found to have sodium of 121, which has persisted through today.  She   was started on Rocephin for possible pneumonia on x-ray and placed on IV   fluids.  Unfortunately, respiratory distress continues and she is using   Ventimask to breath.  Internal medicine has been consulted to address   respiratory distress and hyponatremia.    At this point, the patient appears volume overloaded to me both based on labs   and blood pressure.  IV fluids will be stopped and she will actually be   diuresed.  In addition, I suspect there is some degree of vocal cord trauma.    She does have a history of polio and has chronic hoarse voice, but normally,   she is able to speak much more easily and does not deal with this degree of   secretions.  On x-ray, there is concern for pulmonary edema consistent with   volume overload as well as possible pneumonia.  Given the recent surgery, I   will cover for aspiration pneumonitis as well.    She denies chest pain.  She is short of breath.  She denies nausea, vomiting,   diarrhea, constipation, dysuria, black or bloody stools, focal neurologic   deficit, or skin rash.    PAST MEDICAL HISTORY:  1.  Bulbar polio.  2.  Diabetes mellitus.  3.  Chronic dysphagia related to her bulbar polio.  4.  Hiatal hernia.  5.  Asthma.    PAST SURGICAL  HISTORY:  1.  Hysterectomy.  2.  Repair of cystocele and rectocele by Dr. Liborio Bearden on 01/26/2017.  3.  Cataracts.    SOCIAL HISTORY:  She is a former smoker.  She is a nondrinker.    FAMILY HISTORY:  Reviewed, but noncontributory.    CURRENT MEDICATIONS:  In hospital  1.  Rocephin 2 g daily.  2.  Coenzyme Q supplement.  3.  Fish oil supplement.  4.  Claritin 5 mg daily.  5.  Singulair 10 mg daily.  6.  Multivitamin daily.  7.  Sodium chloride 1 g 3 times daily.  8.  Vitamin D ____ units daily.    ALLERGIES:  ENALAPRIL.    REVIEW OF SYSTEMS:  As in the HPI.    PHYSICAL EXAMINATION:  VITAL SIGNS:  She is afebrile, pulse in the 80s, respiratory rate 20, and   blood pressure 150/72.  GENERAL:  She is using a mask to breathe.  HEENT:  Pupils are equal, round and reactive.  Extraocular movements are   intact.  Mucous membranes are moist.  NECK:  Supple.  There is mild jugular venous distention to approximately the   mid neck.  CARDIOVASCULAR:  She is regular.  LUNGS:  She has very faint and basilar rales.  She has a slight stridor.  ABDOMEN:  Soft.  The wounds are clean from the laparoscopy and there are   bruises.  Bowel sounds present.  BACK:  No CVA tenderness.  EXTREMITIES:  Warm.  There is trace edema.  NEUROLOGIC:  Nonfocal.  SKIN:  There are bruises and clean wounds.  There is no rash.    LABORATORY DATA:  White count 14, hemoglobin 10, hematocrit 31, mean cell   volume is 83, platelets ____, neutrophils 81%, lymphocytes 5%, and monocytes   13%.  Sodium 121, potassium 3.8, chloride is 88, CO2 of 28, glucose 142, BUN   5, creatinine 0.5, and calcium 7.6.  Troponin is 0.07.    STUDIES:  Chest x-ray notes some degree of pulmonary edema and possible   bibasilar consolidation.    IMPRESSION:  1.  Pulmonary edema and volume overload with hyponatremia.  2.  Stridor, likely related to vocal cord irritation and prior history of   bulbar polio.  3.  Possible aspiration pneumonia.  4.  Recent resection of benign serous  cystadenoma and repair of rectocele and   cystocele by Dr. Liborio Bearden.  5.  Diabetes mellitus.  6.  Asthma.  7.  Hiatal hernia.    PLAN:  1.  For the respiratory distress, I will diurese the patient.  Stop IV fluids.    We will also cover for aspiration pneumonia with Rocephin and add Flagyl.    Given the component of stridor, we will also add a steroid burst.  Provide   aerosolized bronchodilators and supplemental oxygen.  Close watch on   respiratory status.  2.  Hyponatremia.  I will stop the IV fluids.  Continue salt tabs.  She will   be diuresed to volume contract.  3.  Asthma.  Continue Singulair.  Prednisone will help.  4.  Prophylaxis.  SCDs.  Laxatives as needed.    Thank you for the consult.  We will follow the patient with you.       ____________________________________     MD DEBBIE OG / RAYA    DD:  01/28/2017 20:49:31  DT:  01/28/2017 23:07:40    D#:  [id number]  Job#:  [id number]

## 2017-01-29 NOTE — PROGRESS NOTES
"Patient refuse blood drawn at this time. Patient stating \"I want to see my doctor before getting poked again.\" \"I should be home by now I don't know why I'm still here.\" Explained to patient the importance of blood drawn for Troponin. No signs of listening. Asked patient what I can do for the patient. Patient wants to eat something. Will bring her some soup and try to convince the patient in a little bit.   "

## 2017-01-29 NOTE — PROGRESS NOTES
"BS report received. Patient sitting up in bed, alert and oriented. Pt c/o headache, denies medication at this time. Offered warm pack and assist with repositioning. Denies  nausea or vomiting. Pt states \"Short of breath is not as bad as before .\"  Pt on 12L oxygen via mist mask sating 96% at this time. Cayla pad with sanguinous drainage. Painting to grvity with moderate yellow urine. POC discussed, verbalized understanding. Bed low and locked, bed alarm on. Call light and belonging within reach and demonstrated use of call light. Fall precaution in effect. Hourly rounding in place.  "

## 2017-01-29 NOTE — CARE PLAN
Problem: Communication  Goal: The ability to communicate needs accurately and effectively will improve  Patient communicates needs appropriately to nursing staff. Patient calls appropriately.    Problem: Venous Thromboembolism (VTW)/Deep Vein Thrombosis (DVT) Prevention:  Goal: Patient will participate in Venous Thrombosis (VTE)/Deep Vein Thrombosis (DVT)Prevention Measures  SCD in place.

## 2017-01-29 NOTE — PROGRESS NOTES
RRT called for chest pressure. EKG done and Troponins drawn. WNL. Pt given ativan for anxiety- states relief of chest pressure.

## 2017-01-29 NOTE — PROGRESS NOTES
Gyn Oncology Progress Note               Author: Ruth Michael Date & Time created: 1/29/2017  11:20 AM     Interval History:  Feels fullnes and need to have a bowel movement. Daughter came in at same time and feels she is more back to her normal self.     Review of Systems:  Review of Systems   Gastrointestinal: Negative for vomiting and abdominal pain.        Feels fullness near rectal area       Physical Exam:  Physical Exam   Constitutional: She appears well-developed and well-nourished.   Cardiovascular: Normal rate.    Pulmonary/Chest: Effort normal.   Abdominal:   Soft, NT, ND   Musculoskeletal: She exhibits no edema or tenderness.       Labs:  Recent Labs      01/26/17   1147  01/26/17   1244   ZTNSW49E  7.05*  7.31*   [id number]  82.0*  41.1*   [id number]  225.8*  133.8*   TMKK7XLO  99.0  98.0   ARTHCO3  22  20   ARTBE  -10*  -6*     Recent Labs      01/28/17   2101  01/29/17   0249  01/29/17   0912   TROPONINI  0.08*  0.10*  0.17*   BNPBTYPENAT  483*   --    --      Recent Labs      01/27/17   0450  01/28/17   0310  01/29/17   0249   SODIUM  121*  121*  121*   POTASSIUM  3.8  3.8  3.4*   CHLORIDE  87*  88*  83*   CO2  23  28  32   BUN  14  5*  4*   CREATININE  0.62  0.46*  0.41*   CALCIUM  8.2*  7.6*  8.2*     Recent Labs      01/27/17   0450  01/28/17   0310  01/29/17   0249   GLUCOSE  130*  142*  111*     Recent Labs      01/27/17   0450  01/28/17   0310   RBC  3.52*  3.33*   HEMOGLOBIN  10.9*  10.0*   HEMATOCRIT  32.8*  30.9*   PLATELETCT  214  180     Recent Labs      01/27/17   0450  01/28/17   0310   WBC  17.8*  13.7*   NEUTSPOLYS  80.20*  81.30*   LYMPHOCYTES  7.20*  4.80*   MONOCYTES  12.10  13.40   EOSINOPHILS  0.10  0.10   BASOPHILS  0.20  0.10     Recent Labs      01/27/17   0450  01/28/17   0310  01/29/17   0249   SODIUM  121*  121*  121*   POTASSIUM  3.8  3.8  3.4*   CHLORIDE  87*  88*  83*   CO2  23  28  32   GLUCOSE  130*  142*  111*   BUN  14  5*  4*   CREATININE  0.62  0.46*  0.41*    CALCIUM  8.2*  7.6*  8.2*     Hemodynamics:  Temp (24hrs), Av.6 °C (97.8 °F), Min:36.3 °C (97.4 °F), Max:36.8 °C (98.2 °F)  Temperature: 36.6 °C (97.8 °F)  Pulse  Av.6  Min: 69  Max: 100   Blood Pressure : 142/94 mmHg     Respiratory:    Respiration: 17, Pulse Oximetry: 96 %     Work Of Breathing / Effort: Moderate  RUL Breath Sounds: Stridor, RML Breath Sounds: Expiratory Wheezes, RLL Breath Sounds: Diminished, SHADIA Breath Sounds: Stridor, LLL Breath Sounds: Diminished  Fluids:    Intake/Output Summary (Last 24 hours) at 17 1120  Last data filed at 17 0730   Gross per 24 hour   Intake    980 ml   Output   5500 ml   Net  -4520 ml        GI/Nutrition:  Orders Placed This Encounter   Procedures   • DIET ORDER     Standing Status: Standing      Number of Occurrences: 1      Standing Expiration Date:      Order Specific Question:  Diet:     Answer:  Regular [1]     Order Specific Question:  Diet:     Answer:  Low Fiber(GI Soft) [2]     Order Specific Question:  Diet:     Answer:  Diabetic [3]     Medical Decision Making, by Problem:  Active Hospital Problems    Diagnosis   • Hypokalemia [E87.6]   • Dyspnea [R06.00]       Plan:  1) Hyponatremia - 121. Per Dr. Willoughby we will continue with salt tablet and stop IVF  2) Hypokalemia - 20 meq oral potassium given  3) Aspiration pneumonia - on Flagyl (Started )  and Ceftriaxone (Started on )  4) leukocytosis - will monitor.  5) Pulmonary edema - per Dr. Willoughby. Thank you Dr. Tyler  6)  Stridor - per Dr. Willoughby  7)  Recent resection of benign serous cystadenoma and repair of rectocele and    cystocele by Dr. Liborio Bearden - will transfer to oncology unit  8)  Diabetes mellitus.  9)  Asthma.  10)  Hiatal hernia.    Case d/w Dr. Bearden  Core Measures

## 2017-01-30 LAB
ANION GAP SERPL CALC-SCNC: 8 MMOL/L (ref 0–11.9)
BASOPHILS # BLD AUTO: 0.1 % (ref 0–1.8)
BASOPHILS # BLD: 0.01 K/UL (ref 0–0.12)
BUN SERPL-MCNC: 10 MG/DL (ref 8–22)
CALCIUM SERPL-MCNC: 8.8 MG/DL (ref 8.5–10.5)
CHLORIDE SERPL-SCNC: 84 MMOL/L (ref 96–112)
CO2 SERPL-SCNC: 31 MMOL/L (ref 20–33)
CREAT SERPL-MCNC: 0.57 MG/DL (ref 0.5–1.4)
EOSINOPHIL # BLD AUTO: 0 K/UL (ref 0–0.51)
EOSINOPHIL NFR BLD: 0 % (ref 0–6.9)
ERYTHROCYTE [DISTWIDTH] IN BLOOD BY AUTOMATED COUNT: 38.2 FL (ref 35.9–50)
GFR SERPL CREATININE-BSD FRML MDRD: >60 ML/MIN/1.73 M 2
GLUCOSE SERPL-MCNC: 134 MG/DL (ref 65–99)
HCT VFR BLD AUTO: 34.7 % (ref 37–47)
HGB BLD-MCNC: 11.8 G/DL (ref 12–16)
IMM GRANULOCYTES # BLD AUTO: 0.05 K/UL (ref 0–0.11)
IMM GRANULOCYTES NFR BLD AUTO: 0.4 % (ref 0–0.9)
LYMPHOCYTES # BLD AUTO: 0.49 K/UL (ref 1–4.8)
LYMPHOCYTES NFR BLD: 4.4 % (ref 22–41)
MAGNESIUM SERPL-MCNC: 1.6 MG/DL (ref 1.5–2.5)
MCH RBC QN AUTO: 30.7 PG (ref 27–33)
MCHC RBC AUTO-ENTMCNC: 34.6 G/DL (ref 33.6–35)
MCV RBC AUTO: 88.7 FL (ref 81.4–97.8)
MONOCYTES # BLD AUTO: 1.73 K/UL (ref 0–0.85)
MONOCYTES NFR BLD AUTO: 15.5 % (ref 0–13.4)
NEUTROPHILS # BLD AUTO: 8.9 K/UL (ref 2–7.15)
NEUTROPHILS NFR BLD: 79.6 % (ref 44–72)
NRBC # BLD AUTO: 0 K/UL
NRBC BLD AUTO-RTO: 0 /100 WBC
PLATELET # BLD AUTO: 228 K/UL (ref 164–446)
PMV BLD AUTO: 9.8 FL (ref 9–12.9)
POTASSIUM SERPL-SCNC: 4 MMOL/L (ref 3.6–5.5)
RBC # BLD AUTO: 3.91 M/UL (ref 4.2–5.4)
SODIUM SERPL-SCNC: 123 MMOL/L (ref 135–145)
TROPONIN I SERPL-MCNC: 0.31 NG/ML (ref 0–0.04)
TROPONIN I SERPL-MCNC: 0.55 NG/ML (ref 0–0.04)
WBC # BLD AUTO: 11.2 K/UL (ref 4.8–10.8)

## 2017-01-30 PROCEDURE — 700105 HCHG RX REV CODE 258: Performed by: SPECIALIST

## 2017-01-30 PROCEDURE — 770009 HCHG ROOM/CARE - ONCOLOGY SEMI PRI*

## 2017-01-30 PROCEDURE — 700102 HCHG RX REV CODE 250 W/ 637 OVERRIDE(OP): Performed by: SPECIALIST

## 2017-01-30 PROCEDURE — 80048 BASIC METABOLIC PNL TOTAL CA: CPT

## 2017-01-30 PROCEDURE — A9270 NON-COVERED ITEM OR SERVICE: HCPCS | Performed by: INTERNAL MEDICINE

## 2017-01-30 PROCEDURE — A9270 NON-COVERED ITEM OR SERVICE: HCPCS | Performed by: SPECIALIST

## 2017-01-30 PROCEDURE — 84484 ASSAY OF TROPONIN QUANT: CPT

## 2017-01-30 PROCEDURE — 700111 HCHG RX REV CODE 636 W/ 250 OVERRIDE (IP): Performed by: FAMILY MEDICINE

## 2017-01-30 PROCEDURE — 99233 SBSQ HOSP IP/OBS HIGH 50: CPT | Performed by: HOSPITALIST

## 2017-01-30 PROCEDURE — 700102 HCHG RX REV CODE 250 W/ 637 OVERRIDE(OP): Performed by: INTERNAL MEDICINE

## 2017-01-30 PROCEDURE — 700102 HCHG RX REV CODE 250 W/ 637 OVERRIDE(OP): Performed by: HOSPITALIST

## 2017-01-30 PROCEDURE — 700111 HCHG RX REV CODE 636 W/ 250 OVERRIDE (IP): Performed by: SPECIALIST

## 2017-01-30 PROCEDURE — A9270 NON-COVERED ITEM OR SERVICE: HCPCS | Performed by: HOSPITALIST

## 2017-01-30 PROCEDURE — 700101 HCHG RX REV CODE 250: Performed by: INTERNAL MEDICINE

## 2017-01-30 PROCEDURE — 700111 HCHG RX REV CODE 636 W/ 250 OVERRIDE (IP): Performed by: INTERNAL MEDICINE

## 2017-01-30 PROCEDURE — 85025 COMPLETE CBC W/AUTO DIFF WBC: CPT

## 2017-01-30 PROCEDURE — 36415 COLL VENOUS BLD VENIPUNCTURE: CPT

## 2017-01-30 RX ADMIN — Medication 30 MG: at 08:16

## 2017-01-30 RX ADMIN — SODIUM CHLORIDE TAB 1 GM 1 G: 1 TAB at 08:19

## 2017-01-30 RX ADMIN — METRONIDAZOLE 500 MG: 500 INJECTION, SOLUTION INTRAVENOUS at 16:42

## 2017-01-30 RX ADMIN — LORAZEPAM 0.25 MG: 1 TABLET ORAL at 06:10

## 2017-01-30 RX ADMIN — MONTELUKAST SODIUM 10 MG: 10 TABLET, FILM COATED ORAL at 16:43

## 2017-01-30 RX ADMIN — METRONIDAZOLE 500 MG: 500 INJECTION, SOLUTION INTRAVENOUS at 06:10

## 2017-01-30 RX ADMIN — METRONIDAZOLE 500 MG: 500 INJECTION, SOLUTION INTRAVENOUS at 23:43

## 2017-01-30 RX ADMIN — OMEGA-3 FATTY ACIDS CAP 1000 MG 1000 MG: 1000 CAP at 09:00

## 2017-01-30 RX ADMIN — GUAIFENESIN 600 MG: 600 TABLET, EXTENDED RELEASE ORAL at 20:05

## 2017-01-30 RX ADMIN — LORATADINE 10 MG: 10 TABLET ORAL at 08:17

## 2017-01-30 RX ADMIN — CEFTRIAXONE 2 G: 2 INJECTION, POWDER, FOR SOLUTION INTRAMUSCULAR; INTRAVENOUS at 08:15

## 2017-01-30 RX ADMIN — FUROSEMIDE 20 MG: 20 TABLET ORAL at 08:17

## 2017-01-30 RX ADMIN — ASPIRIN 81 MG: 81 TABLET, CHEWABLE ORAL at 08:14

## 2017-01-30 RX ADMIN — ANTACID TABLETS 500 MG: 500 TABLET, CHEWABLE ORAL at 00:30

## 2017-01-30 RX ADMIN — METOPROLOL TARTRATE 25 MG: 25 TABLET, FILM COATED ORAL at 20:05

## 2017-01-30 RX ADMIN — ALBUTEROL SULFATE 2 PUFF: 90 AEROSOL, METERED RESPIRATORY (INHALATION) at 06:39

## 2017-01-30 RX ADMIN — ENOXAPARIN SODIUM 60 MG: 100 INJECTION SUBCUTANEOUS at 16:42

## 2017-01-30 RX ADMIN — VITAMIN D, TAB 1000IU (100/BT) 4000 UNITS: 25 TAB at 08:19

## 2017-01-30 RX ADMIN — PREDNISONE 50 MG: 50 TABLET ORAL at 08:19

## 2017-01-30 RX ADMIN — ANTACID TABLETS 500 MG: 500 TABLET, CHEWABLE ORAL at 12:26

## 2017-01-30 RX ADMIN — GUAIFENESIN 600 MG: 600 TABLET, EXTENDED RELEASE ORAL at 08:17

## 2017-01-30 RX ADMIN — ENOXAPARIN SODIUM 60 MG: 100 INJECTION SUBCUTANEOUS at 05:45

## 2017-01-30 RX ADMIN — SODIUM CHLORIDE TAB 1 GM 1 G: 1 TAB at 12:36

## 2017-01-30 RX ADMIN — POTASSIUM CHLORIDE 40 MEQ: 1500 TABLET, EXTENDED RELEASE ORAL at 08:19

## 2017-01-30 RX ADMIN — THERA TABS 1 TABLET: TAB at 08:18

## 2017-01-30 RX ADMIN — SODIUM CHLORIDE TAB 1 GM 1 G: 1 TAB at 16:43

## 2017-01-30 RX ADMIN — METOPROLOL TARTRATE 25 MG: 25 TABLET, FILM COATED ORAL at 08:18

## 2017-01-30 ASSESSMENT — ENCOUNTER SYMPTOMS
COUGH: 0
SHORTNESS OF BREATH: 0
VOMITING: 0
ABDOMINAL PAIN: 0
WHEEZING: 0
FEVER: 0
CONSTIPATION: 0
WEAKNESS: 1
DIARRHEA: 0
NERVOUS/ANXIOUS: 1
NAUSEA: 0
CHILLS: 0

## 2017-01-30 ASSESSMENT — PAIN SCALES - GENERAL: PAINLEVEL_OUTOF10: 0

## 2017-01-30 NOTE — PROGRESS NOTES
Assumed care of pt at 1900.  Resting comfortably.  POC reviewed and updated, pt agreeable.  Will continue hourly rounding.

## 2017-01-30 NOTE — CONSULTS
[id number]    postive trop  Testing WNL aside from small pleural effusions    Can stop full dose anticoagulation  Aspirin 81 daily  Trial metoprolol 12.5 BID and monitor for wheeze,   Trend troponin in the AM  Work of of pleural effusion per primary team, would reduce lasix to 20 mg daily and liely would only need lasix in the short term    Will sign off please call the service if troponin increased dramatically >0.5    will make long term follow up with my clinic    It is my pleasure to participate in the care of Ms. Guerrero.  Please do not hesitate to contact me with questions or concerns.    Iam Gillis MD PhD FACC  Cardiologist [facility]

## 2017-01-30 NOTE — PROGRESS NOTES
Pt is A&O.  Up to chair this am briefly.  Had mini anxiety attack with am coughing spell.  Desated down to 65%.  Recovered back to high 90's. Asking for any meds to be crushed in applesauce.  Informed Suzette Torres NP of pt's admission to onc and updates given.  Suzette to get back to me regarding presence of spencer form cystocele repair.  Spencer is supposed to be DC'd per pt tomorrow.  UOP qs.  Afebrile.  VSS although pt did just start BP meds.  Daughter, Marybel is at bedside.

## 2017-01-30 NOTE — RESPIRATORY CARE
COPD EDUCATION by COPD CLINICAL EDUCATOR  1/30/2017 at 6:50 AM by Julia Millan     Patient reviewed by COPD education team. Patient does not qualify for COPD program.

## 2017-01-30 NOTE — CONSULTS
DATE OF SERVICE:  01/29/2017    REASON FOR CONSULTATION:  I was asked by Dr. Bk Mcleod of the hospital staff   to evaluate this patient with positive troponins in the setting of recent   gynecologic surgery.    CHIEF COMPLAINT:  Here for respiratory complaints after recent gynecologic   surgery.    HISTORY OF PRESENT ILLNESS:  This is a very pleasant 76-year-old woman with   recent laparoscopic resection of complex ovarian mass with repair of her   cystocele and rectocele, overall benign, nonmalignant who presents with   increased workup breathing and yesterday requiring quite a bit oxygen.  She   does have a history of polio, vocal cord abnormality with chronic wheeze,   which has worsened.  She has been treated empirically for pneumonia, steroids   for possible swelling of the upper throat and she had complained of chest   pain, so EKG was normal, but troponin was positive and trended positive, so   she had a CTA to evaluate for PE, which was negative and echocardiogram, which   was normal as well.  She is feeling better, less oxygen, but still using a   bit of oxygen.  In discussion with her she is a little bit hesitant to take   medications as she is concerned about side effects and did have rash reaction   to enalapril in the past, but if it is going to benefit would certainly try   other medications.  She does take fish oil and CoQ10.  She thought _____ baby   aspirin.  On the CT scan, bilateral small pleural effusions and surgery.  She   also has significant hyponatremia.    PAST MEDICAL HISTORY:  History of polio with vocal cord disorder, hypertension   intermittently with normal readings at home apparently, history of asthma and   bronchitis, hiatal hernia, and diabetes.    PAST SURGICAL HISTORY:  Urinary bladder prolapse repair, cataract surgery,   tonsillectomy, hysterectomy, robotic anterior repair 01/26/2017.    FAMILY HISTORY:  Father had heart disease, multiple members had hypertension   and mother  had stroke.    SOCIAL HISTORY:  She has 2 children.  She is .  Former smoker,   quitting in 1967, which is about 5 years, rare alcohol.    ALLERGIES:  ENALAPRIL, WHICH CAUSED RASH, but that was charted in 2014.    HOME MEDICATIONS:  Singulair, naproxen, ______ some other supplements, Zofran,   potassium, chromium, ProAir, omega-3 fatty acids, and CoQ10 100 mg daily.    REVIEW OF SYSTEMS:  Otherwise negative except as mentioned as pertinent   positives and negatives in HPI.    PHYSICAL EXAMINATION:  VITAL SIGNS:  She has been afebrile, heart rate has been normal.  Blood   pressure has been hypertensive than normal.  Her oxygen is 96% on 12 liters,   then down to 91 on 3 liters.  She is complaining of buttock pains 6/10.  HEENT:  Her eyes are anicteric.  Mouth is moist.  NECK:  No bruits.  CHEST:  No rales.  She has transmitted upper airway wheeze sounds.  She does   have coming and going upper airway wheeze sound likely from vocal cord   dysfunction.  CARDIAC:  Regular rate and rhythm.  S1, S2 without significant murmurs, rubs,   or gallops.  ABDOMEN:  Soft, nontender.  EXTREMITIES:  Warm and well perfused, 2+ pulses throughout.  No significant   edema.  NEUROLOGIC:  Grossly nonfocal.  SKIN:  Grossly nonfocal.    DIAGNOSTIC DATA:  Her EKG shows sinus rhythm with normal axis, intervals.    LABORATORY DATA:  Show elevated white count, mild anemia, hematocrit of 31,   platelet 180, hyponatremia, which is new.  Sodium was 121, creatinine 1.5.    Troponin was 0.07.  It was trended upwards slowly.  Chest x-ray shows pleural   effusions.  Echocardiogram shows normal functioning of 65, no significant   diastolic features in a CTA, personally reviewed by me, which does not show   any coronary artery calcifications, but does show small bilateral pleural   effusion.    OVERALL IMPRESSION:  1.  Positive troponin in the setting of hypoxia, recent surgery.  2.  Hypertension.  3.  Bilateral pleural effusions,  small.    RECOMMENDATIONS:  It is my pleasure seeing the patient for evaluation of her   positive troponins.  It is unclear of the overall cause for this, could be   demand ischemia in the setting of a relative hypoxia versus also perhaps a   stress cardiomyopathy.  She does have some stress related to her recent   surgery.  I advised her to take aspirin as well as low dose metoprolol   certainly with her wheezing and history of asthma.  We have to make sure that   she does not have worsening of wheezing with metoprolol, but given her   wheezing it seems to be coming from vocal cord dysfunction.  This would   unlikely be affect by metoprolol with the addition of metoprolol, aspirin   showing improvement and further recurrent stress on the heart.  She is   unlikely to take these medications long term as she is very concerned about   side effects and prefers to take supplemental mediations.  Therefore, I did   not start the discussion on statin therapy as I do not believe that she would   agree to take that long-term and she has a normal lipid profile and multiple   readings in the past with high HDL and low LDL.    Thank you for the opportunity to participate in her care.  We will arrange for   followup on discharge to ensure that she is feeling well.  I do not think she   needs further ischemic evaluation rather just following her symptoms and her   tolerance to aspirin and metoprolol.       ____________________________________     MD DARCY Schuster / RAYA    DD:  01/29/2017 21:05:41  DT:  01/29/2017 23:25:46    D#:  [id number]  Job#:  [id number]

## 2017-01-30 NOTE — PROGRESS NOTES
Hospital Medicine Progress Note, Adult, Complex               Author: Bk SAGE Harsha Date & Time created: 1/29/2017  5:32 PM     Interval History:  CC: SOB / anxiety  1/29: Rapid response overnight for chest pain, appeared to be anxiety related. Trops pretty during day. Consulted anny Gillis. CTA neg for PE. Echo largely unremarkable. Transfer to Onc.     Review of Systems:  Review of Systems   Constitutional: Negative for fever and chills.   Respiratory: Positive for shortness of breath. Negative for cough and wheezing.    Cardiovascular: Negative for chest pain.   Gastrointestinal: Positive for abdominal pain. Negative for nausea, vomiting, diarrhea and constipation.   Psychiatric/Behavioral: The patient is nervous/anxious.        Physical Exam:  Physical Exam   Constitutional: She appears well-developed.   HENT:   Head: Normocephalic.   Cardiovascular: Normal rate.  Exam reveals no gallop.    Pulmonary/Chest: No respiratory distress. She has no wheezes.   Abdominal: She exhibits distension. There is no tenderness.   Neurological: She is alert.       Labs:        Invalid input(s): [id number]  Recent Labs      01/28/17   2101  01/29/17   0249  01/29/17   0912   TROPONINI  0.08*  0.10*  0.17*   BNPBTYPENAT  483*   --    --      Recent Labs      01/27/17   0450  01/28/17   0310  01/29/17   0249   SODIUM  121*  121*  121*   POTASSIUM  3.8  3.8  3.4*   CHLORIDE  87*  88*  83*   CO2  23  28  32   BUN  14  5*  4*   CREATININE  0.62  0.46*  0.41*   CALCIUM  8.2*  7.6*  8.2*     Recent Labs      01/27/17   0450  01/28/17   0310  01/29/17   0249   GLUCOSE  130*  142*  111*     Recent Labs      01/27/17   0450  01/28/17   0310   RBC  3.52*  3.33*   HEMOGLOBIN  10.9*  10.0*   HEMATOCRIT  32.8*  30.9*   PLATELETCT  214  180     Recent Labs      01/27/17   0450  01/28/17   0310   WBC  17.8*  13.7*   NEUTSPOLYS  80.20*  81.30*   LYMPHOCYTES  7.20*  4.80*   MONOCYTES  12.10  13.40   EOSINOPHILS  0.10  0.10   BASOPHILS  0.20   0.10           Hemodynamics:  Temp (24hrs), Av.4 °C (97.6 °F), Min:36.2 °C (97.1 °F), Max:36.6 °C (97.8 °F)  Temperature: 36.2 °C (97.1 °F)  Pulse  Av.3  Min: 69  Max: 100   Blood Pressure : 139/87 mmHg     Respiratory:    Respiration: 20, Pulse Oximetry: 91 %     Work Of Breathing / Effort: Moderate  RUL Breath Sounds: Stridor, RML Breath Sounds: Expiratory Wheezes, RLL Breath Sounds: Diminished, SHADIA Breath Sounds: Stridor, LLL Breath Sounds: Diminished  Fluids:    Intake/Output Summary (Last 24 hours) at 17 1732  Last data filed at 17 1400   Gross per 24 hour   Intake   1340 ml   Output   6000 ml   Net  -4660 ml        GI/Nutrition:  Orders Placed This Encounter   Procedures   • DIET ORDER     Standing Status: Standing      Number of Occurrences: 1      Standing Expiration Date:      Order Specific Question:  Diet:     Answer:  Regular [1]     Order Specific Question:  Diet:     Answer:  Low Fiber(GI Soft) [2]     Order Specific Question:  Diet:     Answer:  Diabetic [3]     Medical Decision Making, by Problem:  Active Hospital Problems    Diagnosis   • *Acute respiratory failure with hypoxia (CMS-HCC) [J96.01]  - was up to 12L  - possibly anxiety related  - CTA neg for PE  - O2 needs significantly decreased now  - added loratadine and mucinex   • Hypokalemia [E87.6]  - repleting   • Hyponatremia [E87.1]  - dc IVF  - cont salt tabs   • Elevated troponin [R79.89]  - consulted cards Carlin  - echo unremarkable  - no need to cont to trend trops per cards   • Asthma [J45.909]   • Cystadenoma of right ovary [D27.0]  - s/p resection Bearden   • Dyspnea [R06.00]   • Anxiety [F41.9]  - reassuring pt        Labs reviewed and Medications reviewed  Painting catheter: Critically Ill - Requiring Accurate Measurement of Urinary Output

## 2017-01-31 ENCOUNTER — APPOINTMENT (OUTPATIENT)
Dept: RADIOLOGY | Facility: MEDICAL CENTER | Age: 77
DRG: 205 | End: 2017-01-31
Attending: HOSPITALIST
Payer: MEDICARE

## 2017-01-31 PROBLEM — R13.10 DYSPHAGIA: Status: ACTIVE | Noted: 2017-01-31

## 2017-01-31 LAB
ANION GAP SERPL CALC-SCNC: 8 MMOL/L (ref 0–11.9)
BASOPHILS # BLD AUTO: 0.1 % (ref 0–1.8)
BASOPHILS # BLD: 0.01 K/UL (ref 0–0.12)
BUN SERPL-MCNC: 12 MG/DL (ref 8–22)
C DIFF DNA SPEC QL NAA+PROBE: NEGATIVE
C DIFF TOX GENS STL QL NAA+PROBE: NEGATIVE
CALCIUM SERPL-MCNC: 9 MG/DL (ref 8.5–10.5)
CHLORIDE SERPL-SCNC: 84 MMOL/L (ref 96–112)
CO2 SERPL-SCNC: 32 MMOL/L (ref 20–33)
CREAT SERPL-MCNC: 0.59 MG/DL (ref 0.5–1.4)
EOSINOPHIL # BLD AUTO: 0.02 K/UL (ref 0–0.51)
EOSINOPHIL NFR BLD: 0.2 % (ref 0–6.9)
ERYTHROCYTE [DISTWIDTH] IN BLOOD BY AUTOMATED COUNT: 39.2 FL (ref 35.9–50)
GFR SERPL CREATININE-BSD FRML MDRD: >60 ML/MIN/1.73 M 2
GLUCOSE SERPL-MCNC: 110 MG/DL (ref 65–99)
HCT VFR BLD AUTO: 33.2 % (ref 37–47)
HGB BLD-MCNC: 11.2 G/DL (ref 12–16)
IMM GRANULOCYTES # BLD AUTO: 0.05 K/UL (ref 0–0.11)
IMM GRANULOCYTES NFR BLD AUTO: 0.4 % (ref 0–0.9)
LYMPHOCYTES # BLD AUTO: 0.72 K/UL (ref 1–4.8)
LYMPHOCYTES NFR BLD: 5.5 % (ref 22–41)
MCH RBC QN AUTO: 30 PG (ref 27–33)
MCHC RBC AUTO-ENTMCNC: 33.7 G/DL (ref 33.6–35)
MCV RBC AUTO: 89 FL (ref 81.4–97.8)
MONOCYTES # BLD AUTO: 1.8 K/UL (ref 0–0.85)
MONOCYTES NFR BLD AUTO: 13.7 % (ref 0–13.4)
NEUTROPHILS # BLD AUTO: 10.54 K/UL (ref 2–7.15)
NEUTROPHILS NFR BLD: 80.1 % (ref 44–72)
NRBC # BLD AUTO: 0 K/UL
NRBC BLD AUTO-RTO: 0 /100 WBC
PLATELET # BLD AUTO: 251 K/UL (ref 164–446)
PMV BLD AUTO: 9.6 FL (ref 9–12.9)
POTASSIUM SERPL-SCNC: 3.4 MMOL/L (ref 3.6–5.5)
RBC # BLD AUTO: 3.73 M/UL (ref 4.2–5.4)
SODIUM SERPL-SCNC: 124 MMOL/L (ref 135–145)
WBC # BLD AUTO: 13.1 K/UL (ref 4.8–10.8)

## 2017-01-31 PROCEDURE — 97165 OT EVAL LOW COMPLEX 30 MIN: CPT

## 2017-01-31 PROCEDURE — 700102 HCHG RX REV CODE 250 W/ 637 OVERRIDE(OP): Performed by: HOSPITALIST

## 2017-01-31 PROCEDURE — G8996 SWALLOW CURRENT STATUS: HCPCS | Mod: CL

## 2017-01-31 PROCEDURE — 36415 COLL VENOUS BLD VENIPUNCTURE: CPT

## 2017-01-31 PROCEDURE — 700102 HCHG RX REV CODE 250 W/ 637 OVERRIDE(OP): Performed by: INTERNAL MEDICINE

## 2017-01-31 PROCEDURE — 92610 EVALUATE SWALLOWING FUNCTION: CPT

## 2017-01-31 PROCEDURE — A9270 NON-COVERED ITEM OR SERVICE: HCPCS | Performed by: INTERNAL MEDICINE

## 2017-01-31 PROCEDURE — G8987 SELF CARE CURRENT STATUS: HCPCS | Mod: CI

## 2017-01-31 PROCEDURE — 83735 ASSAY OF MAGNESIUM: CPT

## 2017-01-31 PROCEDURE — A9270 NON-COVERED ITEM OR SERVICE: HCPCS | Performed by: HOSPITALIST

## 2017-01-31 PROCEDURE — 700105 HCHG RX REV CODE 258: Performed by: SPECIALIST

## 2017-01-31 PROCEDURE — 700111 HCHG RX REV CODE 636 W/ 250 OVERRIDE (IP): Performed by: SPECIALIST

## 2017-01-31 PROCEDURE — 700111 HCHG RX REV CODE 636 W/ 250 OVERRIDE (IP): Performed by: INTERNAL MEDICINE

## 2017-01-31 PROCEDURE — 71010 DX-CHEST-PORTABLE (1 VIEW): CPT

## 2017-01-31 PROCEDURE — 80048 BASIC METABOLIC PNL TOTAL CA: CPT

## 2017-01-31 PROCEDURE — 770009 HCHG ROOM/CARE - ONCOLOGY SEMI PRI*

## 2017-01-31 PROCEDURE — G8988 SELF CARE GOAL STATUS: HCPCS | Mod: CI

## 2017-01-31 PROCEDURE — 700102 HCHG RX REV CODE 250 W/ 637 OVERRIDE(OP): Performed by: SPECIALIST

## 2017-01-31 PROCEDURE — 87493 C DIFF AMPLIFIED PROBE: CPT

## 2017-01-31 PROCEDURE — 99233 SBSQ HOSP IP/OBS HIGH 50: CPT | Performed by: INTERNAL MEDICINE

## 2017-01-31 PROCEDURE — 85025 COMPLETE CBC W/AUTO DIFF WBC: CPT

## 2017-01-31 PROCEDURE — 700101 HCHG RX REV CODE 250: Performed by: INTERNAL MEDICINE

## 2017-01-31 PROCEDURE — 700111 HCHG RX REV CODE 636 W/ 250 OVERRIDE (IP): Performed by: HOSPITALIST

## 2017-01-31 PROCEDURE — A9270 NON-COVERED ITEM OR SERVICE: HCPCS | Performed by: SPECIALIST

## 2017-01-31 PROCEDURE — G8997 SWALLOW GOAL STATUS: HCPCS | Mod: CJ

## 2017-01-31 RX ORDER — SODIUM CHLORIDE AND POTASSIUM CHLORIDE 150; 900 MG/100ML; MG/100ML
INJECTION, SOLUTION INTRAVENOUS CONTINUOUS
Status: DISCONTINUED | OUTPATIENT
Start: 2017-01-31 | End: 2017-02-01

## 2017-01-31 RX ORDER — METRONIDAZOLE 500 MG/1
500 TABLET ORAL EVERY 8 HOURS
Status: DISCONTINUED | OUTPATIENT
Start: 2017-01-31 | End: 2017-01-31

## 2017-01-31 RX ADMIN — ASPIRIN 81 MG: 81 TABLET, CHEWABLE ORAL at 08:47

## 2017-01-31 RX ADMIN — PREDNISONE 50 MG: 50 TABLET ORAL at 08:46

## 2017-01-31 RX ADMIN — METRONIDAZOLE 500 MG: 500 INJECTION, SOLUTION INTRAVENOUS at 06:31

## 2017-01-31 RX ADMIN — Medication 30 MG: at 08:45

## 2017-01-31 RX ADMIN — POTASSIUM CHLORIDE 20 MEQ: 1500 TABLET, EXTENDED RELEASE ORAL at 08:47

## 2017-01-31 RX ADMIN — LORATADINE 10 MG: 10 TABLET ORAL at 08:45

## 2017-01-31 RX ADMIN — CEFTRIAXONE 2 G: 2 INJECTION, POWDER, FOR SOLUTION INTRAMUSCULAR; INTRAVENOUS at 08:25

## 2017-01-31 RX ADMIN — GUAIFENESIN 600 MG: 600 TABLET, EXTENDED RELEASE ORAL at 08:45

## 2017-01-31 RX ADMIN — FUROSEMIDE 20 MG: 20 TABLET ORAL at 08:45

## 2017-01-31 RX ADMIN — METRONIDAZOLE 500 MG: 500 INJECTION, SOLUTION INTRAVENOUS at 21:35

## 2017-01-31 RX ADMIN — ANTACID TABLETS 500 MG: 500 TABLET, CHEWABLE ORAL at 04:21

## 2017-01-31 RX ADMIN — POTASSIUM CHLORIDE AND SODIUM CHLORIDE: 900; 150 INJECTION, SOLUTION INTRAVENOUS at 17:00

## 2017-01-31 RX ADMIN — METRONIDAZOLE 500 MG: 500 INJECTION, SOLUTION INTRAVENOUS at 15:09

## 2017-01-31 RX ADMIN — METOPROLOL TARTRATE 25 MG: 25 TABLET, FILM COATED ORAL at 08:46

## 2017-01-31 RX ADMIN — ENOXAPARIN SODIUM 40 MG: 100 INJECTION SUBCUTANEOUS at 08:45

## 2017-01-31 ASSESSMENT — ENCOUNTER SYMPTOMS
SPEECH CHANGE: 0
NERVOUS/ANXIOUS: 0
MYALGIAS: 0
SENSORY CHANGE: 0
FEVER: 0
DIAPHORESIS: 0
HEADACHES: 0
HALLUCINATIONS: 0
PHOTOPHOBIA: 0
NAUSEA: 0
COUGH: 0
VOMITING: 0
BLURRED VISION: 0
DIARRHEA: 1
SHORTNESS OF BREATH: 0
DEPRESSION: 0
DIZZINESS: 0
CLAUDICATION: 0
ABDOMINAL PAIN: 0

## 2017-01-31 ASSESSMENT — PAIN SCALES - GENERAL
PAINLEVEL_OUTOF10: 0
PAINLEVEL_OUTOF10: 0

## 2017-01-31 ASSESSMENT — ACTIVITIES OF DAILY LIVING (ADL): TOILETING: INDEPENDENT

## 2017-01-31 NOTE — PROGRESS NOTES
"  Gyn Onc Visit    HD#:   3     S: Mild SOB, pain ok, no n/v, no chest pain    O:   /83 mmHg  Pulse 72  Temp(Src) 36.5 °C (97.7 °F)  Resp 18  Ht 1.575 m (5' 2\")  Wt 57.607 kg (127 lb)  BMI 23.22 kg/m2  SpO2 92%  Breastfeeding? No     Gen: A&O x3, NAD   Chest: RRR, +cough, LS diminished t/o, stridor, O2 via mask   Abd: soft, nontender, nondistended   Pelvis: deferred   Ext: no edema, nontender, + pulses bilaterally    Labs:  Troponin 0.55    Recent Labs      01/28/17   0310  01/29/17   2344   WBC  13.7*  11.2*   RBC  3.33*  3.91*   HEMOGLOBIN  10.0*  11.8*   HEMATOCRIT  30.9*  34.7*   MCV  92.8  88.7   MCH  30.0  30.7   RDW  43.9  38.2   PLATELETCT  180  228   MPV  9.8  9.8   NEUTSPOLYS  81.30*  79.60*   LYMPHOCYTES  4.80*  4.40*   MONOCYTES  13.40  15.50*   EOSINOPHILS  0.10  0.00   BASOPHILS  0.10  0.10     Lab Results   Component Value Date/Time    SODIUM 123* 01/29/2017 11:44 PM    POTASSIUM 4.0 01/29/2017 11:44 PM    CHLORIDE 84* 01/29/2017 11:44 PM    CO2 31 01/29/2017 11:44 PM    GLUCOSE 134* 01/29/2017 11:44 PM    BUN 10 01/29/2017 11:44 PM    CREATININE 0.57 01/29/2017 11:44 PM      Lab Results   Component Value Date/Time    PT 12.3 01/23/2017 12:59 PM    INR 0.89 01/23/2017 12:59 PM        A/P:  s/p resection of benign serous cystadenoma and repair of rectocele and cystocele by Dr. Bearden on 1/26  Admit for SOB/dyspnea on 1/27    1.  Hyponatremia: Improved to 123 from 121 - PO salt TID per Hospitalist, no IVF  2.  Hypokalemia: Stable at 4   3.  Aspiration pneumonia: +stridor, continues on Flagyl (Started 1/28)  and Ceftriaxone (Started on 1/27), hx Asthma  4.  Leukocytosis: improved to 11.2, IV abx in place  5.  Pulmonary edema: Daily lasix continues, no IVF  6.  Elevated troponin: s/p rapid response 1/29, negative PE, trop elevated to 0.55 today, s/p Cards consult, repeat lab due - may transfer to [facility] if continued  7.  Painting - s/p cystocele repair - discharged with Painting, was anticipated in " office tomorrow for Painting d/c - will d/c catheter tomorrow      JENNY Mims.   [facility]  Office of Dr. Liborio Bearden  P: [phone]  F: [phone]

## 2017-01-31 NOTE — THERAPY
"Speech Language Therapy Clinical Swallow Evaluation completed.    Functional Status: Patient AAOx4 with daughter at bedside. Oral motor examination was WFL. Patient noted to have red irritation on right side of soft palate, suspect from recent intubation. Voice stridorous and hoarse in quality. 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 Daughter reported hoarse vocal quality not baseline for patient and occurred after recent intubation. She also was also noted to have intermittent audible inhalation and slight wheezing prior to PO intake. Presentation of 4 ice chips resulted in no overt s/sx of aspiration. Presentation of 4 oz NTL resulted in throat clear x2 and audible gulping. Presentation of 3 oz puree resulted in coughing x1 and patient politely declining further PO. Patient and family extensively educated in regards to current status, s/sx of aspiration, risk of aspiration, and SLP recs.     Recommendations - Diet:  At this time, patient is presenting s/sx concerning for aspiration and would recommend NPO (OK for 3-5 ice chips/hour with RN) with FEES evaluation tomorrow AM for further assess oropharyngeal function.                             Strategies: TBD                            Medication Administration: Crush medication in puree    Plan of Care: Will benefit from Speech Therapy 3 times per week    See \"Rehab Therapy-Acute\" Patient Summary Report for complete documentation. Thank you for the consult.       "

## 2017-01-31 NOTE — PROGRESS NOTES
Assisting pt with meds and pt continues to cough. Discussed aspiration precautions with pt, pt verbalized understanding however is noncompliant. Orders received for swallow eval. Education provided.

## 2017-01-31 NOTE — PROGRESS NOTES
Hospital Medicine Progress Note, Adult, Complex               Author: María Elena Chavira Date & Time created: 1/30/2017  9:43 PM     Interval History:  CC: SOB / anxiety  1/29: Rapid response overnight for chest pain, appeared to be anxiety related. Trops pretty during day. Consulted anny Gillis. CTA neg for PE. Echo largely unremarkable. Transfer to Onc.   1/30:  Troponin elevated to 0.55, ordered repeat:  Trending down.  Denies chest pain.  Likely demand ischemia from DEBBY.  Painting in place.    Review of Systems:  Review of Systems   Constitutional: Positive for malaise/fatigue. Negative for fever and chills.   Respiratory: Negative for cough, shortness of breath and wheezing.    Cardiovascular: Negative for chest pain.   Gastrointestinal: Negative for nausea, vomiting, abdominal pain, diarrhea and constipation.   Neurological: Positive for weakness.   Psychiatric/Behavioral: The patient is nervous/anxious.        Physical Exam:  Physical Exam   Constitutional: She appears well-developed.   HENT:   Head: Normocephalic.   Cardiovascular: Normal rate.  Exam reveals no gallop.    Pulmonary/Chest: No respiratory distress. She has no wheezes.   Abdominal: She exhibits no distension. There is no tenderness.   Neurological: She is alert.   Psychiatric: She has a normal mood and affect. Her behavior is normal. Judgment and thought content normal.   Nursing note and vitals reviewed.      Labs:        Invalid input(s): [id number]  Recent Labs      01/28/17   2101   01/29/17   0912  01/29/17   2344  01/30/17   1137   TROPONINI  0.08*   < >  0.17*  0.55*  0.31*   BNPBTYPENAT  483*   --    --    --    --     < > = values in this interval not displayed.     Recent Labs      01/28/17   0310  01/29/17   0249  01/29/17   2344   SODIUM  121*  121*  123*   POTASSIUM  3.8  3.4*  4.0   CHLORIDE  88*  83*  84*   CO2  28  32  31   BUN  5*  4*  10   CREATININE  0.46*  0.41*  0.57   MAGNESIUM   --    --   1.6   CALCIUM  7.6*  8.2*  8.8      Recent Labs      17   0310  17   0249  17   2344   GLUCOSE  142*  111*  134*     Recent Labs      17   0310  17   2344   RBC  3.33*  3.91*   HEMOGLOBIN  10.0*  11.8*   HEMATOCRIT  30.9*  34.7*   PLATELETCT  180  228     Recent Labs      17   0310  17   2344   WBC  13.7*  11.2*   NEUTSPOLYS  81.30*  79.60*   LYMPHOCYTES  4.80*  4.40*   MONOCYTES  13.40  15.50*   EOSINOPHILS  0.10  0.00   BASOPHILS  0.10  0.10           Hemodynamics:  Temp (24hrs), Av.7 °C (98 °F), Min:36.2 °C (97.2 °F), Max:37 °C (98.6 °F)  Temperature: 36.5 °C (97.7 °F)  Pulse  Av.9  Min: 69  Max: 100   Blood Pressure : 131/83 mmHg     Respiratory:    Respiration: 18, Pulse Oximetry: 92 %     Work Of Breathing / Effort: Moderate  RUL Breath Sounds: Expiratory Wheezes, RML Breath Sounds: Clear, RLL Breath Sounds: Diminished, SHADIA Breath Sounds: Clear, LLL Breath Sounds: Diminished  Fluids:    Intake/Output Summary (Last 24 hours) at 17 2143  Last data filed at 17 1828   Gross per 24 hour   Intake      0 ml   Output   4100 ml   Net  -4100 ml        GI/Nutrition:  Orders Placed This Encounter   Procedures   • DIET ORDER     Standing Status: Standing      Number of Occurrences: 1      Standing Expiration Date:      Order Specific Question:  Diet:     Answer:  Diabetic [3]     Medical Decision Making, by Problem:  Active Hospital Problems    Diagnosis   • *Acute respiratory failure with hypoxia (CMS-HCC) [J96.01]  - was up to 12L  - CTA neg for PE, unremarkable  - O2 needs significantly decreased now  - added loratadine and mucinex  dc'd bid lovenox  ?aspiration pneumonia vs. Fluid overload  On flagyl, rocephin, lasix, prednisone  Improving  Repeat CXR   • Hypokalemia [E87.6]  - repleting   • Hyponatremia [E87.1]  - dc IVF  - cont salt tabs   • Elevated troponin [R79.89]  - consulted cards Carlin  - echo unremarkable  - repeat trop decreasing:  Likely demand ischemia  rec metoprolol, ASA    • Asthma [J45.909]   • Cystadenoma of right ovary [D27.0]  - s/p resection and rectocele and cystocele 1/26/17   • Dyspnea [R06.00]   • Anxiety [F41.9]  - resolved     FC, states feels weak.  Ordered PT/OT evals.   Ordered spencer dc in a.m.  1/30:  Meets inpatient requirements with hyponatremia.  Anticipate > 2MN stay.    Labs reviewed and Medications reviewed  Spencer catheter: One or Two Days Post Surgery (Day of Surgery being Day 0)      DVT Prophylaxis: Enoxaparin (Lovenox)    Ulcer prophylaxis: Not indicated  Antibiotics: Treating active infection/contamination beyond 24 hours perioperative coverage  Assessed for rehab: Patient was assess for and/or received rehabilitation services during this hospitalization

## 2017-01-31 NOTE — PROGRESS NOTES
Bedside report received from night shift, assumed care of pt at 0715. Pt in bed resting comfortably with no s/sx of pain or discomfort. Pt inc of stool multiple times this am. Discussed WOB with pt, pt states that the obvious wheezing is her baseline at home. Pt o2 sats are WNL. Daughter at bedside. Pt calls appropriately for medication or assistance as needed. Call light within reach, all appropriate fall precautions in place and personal belongings within reach; hourly rounding in place. No needs at this time. See flowsheets for further assessment details.

## 2017-01-31 NOTE — PROGRESS NOTES
"  Gyn Onc Visit    HD#:   4     S: Feeling better, voice stronger, no pain or n/v, no fever, mild SOB w/ speech    O:   /83 mmHg  Pulse 71  Temp(Src) 36.3 °C (97.4 °F)  Resp 20  Ht 1.575 m (5' 2\")  Wt 57.607 kg (127 lb)  BMI 23.22 kg/m2  SpO2 94%  Breastfeeding? No     Gen: A&O x3, NAD              Chest: RRR, +cough, LS diminished t/o, stridor improved              Abd: soft, nontender, nondistended              Pelvis: deferred (Painting)              Ext: no edema, nontender, + pulses bilaterally    Labs:  Recent Labs      01/29/17   2344  01/30/17   2342   WBC  11.2*  13.1*   RBC  3.91*  3.73*   HEMOGLOBIN  11.8*  11.2*   HEMATOCRIT  34.7*  33.2*   MCV  88.7  89.0   MCH  30.7  30.0   RDW  38.2  39.2   PLATELETCT  228  251   MPV  9.8  9.6   NEUTSPOLYS  79.60*  80.10*   LYMPHOCYTES  4.40*  5.50*   MONOCYTES  15.50*  13.70*   EOSINOPHILS  0.00  0.20   BASOPHILS  0.10  0.10     Lab Results   Component Value Date/Time    SODIUM 124* 01/30/2017 11:42 PM    POTASSIUM 3.4* 01/30/2017 11:42 PM    CHLORIDE 84* 01/30/2017 11:42 PM    CO2 32 01/30/2017 11:42 PM    GLUCOSE 110* 01/30/2017 11:42 PM    BUN 12 01/30/2017 11:42 PM    CREATININE 0.59 01/30/2017 11:42 PM      Lab Results   Component Value Date/Time    PT 12.3 01/23/2017 12:59 PM    INR 0.89 01/23/2017 12:59 PM        A/P:  s/p resection of benign serous cystadenoma and repair of rectocele and cystocele by Dr. Bearden on 1/26  Admit for SOB/dyspnea on 1/27    1.  Hyponatremia: Improved to 124 with TKO of IVF, PO salt TID per Hospitalist  2.  Hypokalemia: Decresed to 3.4 from 4 today, on lasix and 40 PO KCl daily - will defer to hospitalist  3.  Aspiration pneumonia: +stridor but imprvoed, continues on Flagyl (Started 1/28)  and Ceftriaxone (Started on 1/27), hx Asthma  4.  Leukocytosis: 13.1 today, afebrile, will monitor: IV abx continue  5.  Pulmonary edema: Daily lasix continues, IVF at TKO for abx  6.  Elevated troponin: Improved to 0.31 from 0.55 - s/p " rapid response 1/29, negative PE  7.  Painting - s/p cystocele repair 1/26, RN will d/c Painting today    JENNY Mims.  [facility]  Office of Dr. Liborio Bearden  P: [phone]  F: [phone]

## 2017-01-31 NOTE — CARE PLAN
Problem: Safety  Goal: Will remain free from falls  Intervention: Assess risk factors for falls  Pt assessed for fall risk. Encouraged to call for assistance.       Problem: Discharge Barriers/Planning  Goal: Patient’s continuum of care needs will be met  Intervention: Collaborate with Transitional Care Team and Interdisciplinary Team to meet discharge needs  Discussed dc planning with multidisciplinary team

## 2017-01-31 NOTE — PROGRESS NOTES
Received report from day RN and assumed care of pt at change of shift.  Pt is A&0x4, wears oxygen PRN, put her on the  for the night. She has stridor noise coming from her throat, lung fields mostly clear; did hear expiratory wheeze from right upper lobe when auscultating posterior side.   Sequentials on and machine running.   Pt denies pain. Appears calm. Medicated per MAR. All needs met at this time. Reviewed POC for the night, pt agreeable.   Bed in lowest and locked position, call light within reach. Pt uses call light appropriately.

## 2017-01-31 NOTE — THERAPY
"Occupational Therapy Evaluation completed.   Functional Status:  Pt seen for OT eval due to dyspnea and generalized weakness. SBA for ADLs requiring assistance for heaving IADLs at this time.    Plan of Care: Will benefit from Occupational Therapy 3 times per week  Discharge Recommendations:  Equipment: Shower Chair. Post-acute therapy recommended after discharged home.    See \"Rehab Therapy-Acute\" Patient Summary Report for complete documentation.    "

## 2017-02-01 LAB
ANION GAP SERPL CALC-SCNC: 10 MMOL/L (ref 0–11.9)
BASOPHILS # BLD AUTO: 0.2 % (ref 0–1.8)
BASOPHILS # BLD: 0.02 K/UL (ref 0–0.12)
BUN SERPL-MCNC: 14 MG/DL (ref 8–22)
CALCIUM SERPL-MCNC: 9.4 MG/DL (ref 8.5–10.5)
CHLORIDE SERPL-SCNC: 91 MMOL/L (ref 96–112)
CO2 SERPL-SCNC: 30 MMOL/L (ref 20–33)
CREAT SERPL-MCNC: 0.57 MG/DL (ref 0.5–1.4)
EOSINOPHIL # BLD AUTO: 0 K/UL (ref 0–0.51)
EOSINOPHIL NFR BLD: 0 % (ref 0–6.9)
ERYTHROCYTE [DISTWIDTH] IN BLOOD BY AUTOMATED COUNT: 42.2 FL (ref 35.9–50)
GFR SERPL CREATININE-BSD FRML MDRD: >60 ML/MIN/1.73 M 2
GLUCOSE SERPL-MCNC: 106 MG/DL (ref 65–99)
HCT VFR BLD AUTO: 36.6 % (ref 37–47)
HGB BLD-MCNC: 12.4 G/DL (ref 12–16)
IMM GRANULOCYTES # BLD AUTO: 0.06 K/UL (ref 0–0.11)
IMM GRANULOCYTES NFR BLD AUTO: 0.5 % (ref 0–0.9)
LYMPHOCYTES # BLD AUTO: 0.64 K/UL (ref 1–4.8)
LYMPHOCYTES NFR BLD: 5.4 % (ref 22–41)
MAGNESIUM SERPL-MCNC: 1.6 MG/DL (ref 1.5–2.5)
MCH RBC QN AUTO: 30.8 PG (ref 27–33)
MCHC RBC AUTO-ENTMCNC: 33.9 G/DL (ref 33.6–35)
MCV RBC AUTO: 90.8 FL (ref 81.4–97.8)
MONOCYTES # BLD AUTO: 1.68 K/UL (ref 0–0.85)
MONOCYTES NFR BLD AUTO: 14.1 % (ref 0–13.4)
NEUTROPHILS # BLD AUTO: 9.55 K/UL (ref 2–7.15)
NEUTROPHILS NFR BLD: 79.8 % (ref 44–72)
NRBC # BLD AUTO: 0 K/UL
NRBC BLD AUTO-RTO: 0 /100 WBC
PLATELET # BLD AUTO: 294 K/UL (ref 164–446)
PMV BLD AUTO: 9.2 FL (ref 9–12.9)
POTASSIUM SERPL-SCNC: 3.5 MMOL/L (ref 3.6–5.5)
RBC # BLD AUTO: 4.03 M/UL (ref 4.2–5.4)
SODIUM SERPL-SCNC: 131 MMOL/L (ref 135–145)
WBC # BLD AUTO: 12 K/UL (ref 4.8–10.8)

## 2017-02-01 PROCEDURE — 700102 HCHG RX REV CODE 250 W/ 637 OVERRIDE(OP): Performed by: HOSPITALIST

## 2017-02-01 PROCEDURE — 92612 ENDOSCOPY SWALLOW (FEES) VID: CPT

## 2017-02-01 PROCEDURE — 700111 HCHG RX REV CODE 636 W/ 250 OVERRIDE (IP): Performed by: SPECIALIST

## 2017-02-01 PROCEDURE — G8996 SWALLOW CURRENT STATUS: HCPCS | Mod: CI

## 2017-02-01 PROCEDURE — 36415 COLL VENOUS BLD VENIPUNCTURE: CPT

## 2017-02-01 PROCEDURE — A9270 NON-COVERED ITEM OR SERVICE: HCPCS | Performed by: SPECIALIST

## 2017-02-01 PROCEDURE — 700101 HCHG RX REV CODE 250: Performed by: INTERNAL MEDICINE

## 2017-02-01 PROCEDURE — G8978 MOBILITY CURRENT STATUS: HCPCS | Mod: CI

## 2017-02-01 PROCEDURE — A9270 NON-COVERED ITEM OR SERVICE: HCPCS | Performed by: HOSPITALIST

## 2017-02-01 PROCEDURE — 700102 HCHG RX REV CODE 250 W/ 637 OVERRIDE(OP): Performed by: SPECIALIST

## 2017-02-01 PROCEDURE — 770009 HCHG ROOM/CARE - ONCOLOGY SEMI PRI*

## 2017-02-01 PROCEDURE — 99233 SBSQ HOSP IP/OBS HIGH 50: CPT | Performed by: INTERNAL MEDICINE

## 2017-02-01 PROCEDURE — G8997 SWALLOW GOAL STATUS: HCPCS | Mod: CI

## 2017-02-01 PROCEDURE — 700101 HCHG RX REV CODE 250: Performed by: HOSPITALIST

## 2017-02-01 PROCEDURE — 700111 HCHG RX REV CODE 636 W/ 250 OVERRIDE (IP): Performed by: HOSPITALIST

## 2017-02-01 PROCEDURE — 97162 PT EVAL MOD COMPLEX 30 MIN: CPT

## 2017-02-01 PROCEDURE — 700105 HCHG RX REV CODE 258: Performed by: SPECIALIST

## 2017-02-01 PROCEDURE — G8979 MOBILITY GOAL STATUS: HCPCS | Mod: CI

## 2017-02-01 PROCEDURE — 80048 BASIC METABOLIC PNL TOTAL CA: CPT

## 2017-02-01 RX ORDER — LOPERAMIDE HYDROCHLORIDE 2 MG/1
2 CAPSULE ORAL 4 TIMES DAILY PRN
Status: DISCONTINUED | OUTPATIENT
Start: 2017-02-01 | End: 2017-02-02 | Stop reason: HOSPADM

## 2017-02-01 RX ADMIN — METRONIDAZOLE 500 MG: 500 INJECTION, SOLUTION INTRAVENOUS at 05:42

## 2017-02-01 RX ADMIN — ENOXAPARIN SODIUM 40 MG: 100 INJECTION SUBCUTANEOUS at 08:30

## 2017-02-01 RX ADMIN — LORAZEPAM 0.25 MG: 1 TABLET ORAL at 04:21

## 2017-02-01 RX ADMIN — METOPROLOL TARTRATE 25 MG: 25 TABLET, FILM COATED ORAL at 20:04

## 2017-02-01 RX ADMIN — MONTELUKAST SODIUM 10 MG: 10 TABLET, FILM COATED ORAL at 20:04

## 2017-02-01 RX ADMIN — CEFTRIAXONE 2 G: 2 INJECTION, POWDER, FOR SOLUTION INTRAMUSCULAR; INTRAVENOUS at 08:30

## 2017-02-01 RX ADMIN — LABETALOL HYDROCHLORIDE 10 MG: 5 INJECTION, SOLUTION INTRAVENOUS at 04:19

## 2017-02-01 RX ADMIN — ALBUTEROL SULFATE 2 PUFF: 90 AEROSOL, METERED RESPIRATORY (INHALATION) at 04:21

## 2017-02-01 RX ADMIN — POTASSIUM CHLORIDE AND SODIUM CHLORIDE: 900; 150 INJECTION, SOLUTION INTRAVENOUS at 03:30

## 2017-02-01 RX ADMIN — SODIUM CHLORIDE TAB 1 GM 1 G: 1 TAB at 14:11

## 2017-02-01 RX ADMIN — GUAIFENESIN 600 MG: 600 TABLET, EXTENDED RELEASE ORAL at 20:04

## 2017-02-01 RX ADMIN — SODIUM CHLORIDE TAB 1 GM 1 G: 1 TAB at 17:39

## 2017-02-01 ASSESSMENT — GAIT ASSESSMENTS
DISTANCE (FEET): 200
GAIT LEVEL OF ASSIST: STAND BY ASSIST

## 2017-02-01 ASSESSMENT — ENCOUNTER SYMPTOMS
HEADACHES: 0
FEVER: 0
PHOTOPHOBIA: 0
SPEECH CHANGE: 0
VOMITING: 0
NERVOUS/ANXIOUS: 0
SENSORY CHANGE: 0
DEPRESSION: 0
COUGH: 0
NAUSEA: 0
HALLUCINATIONS: 0
DIAPHORESIS: 0
ABDOMINAL PAIN: 0
CLAUDICATION: 0
MYALGIAS: 0
DIZZINESS: 0
SHORTNESS OF BREATH: 0
DIARRHEA: 0
BLURRED VISION: 0

## 2017-02-01 ASSESSMENT — PAIN SCALES - GENERAL
PAINLEVEL_OUTOF10: 2
PAINLEVEL_OUTOF10: 3

## 2017-02-01 NOTE — H&P
CHIEF COMPLAINT:  Shortness of breath and difficulty breathing.    HISTORY OF PRESENTING COMPLAINT:  The patient is a 76-year-old who presented   to the emergency room overnight.  She was discharged from the hospital   yesterday after a robotic assisted bilateral salpingo-oophorectomy and   anterior and posterior vaginal repair.  The patient was feeling some chest   discomfort, complaining of phlegm in her throat yesterday night from 10 p.m.   until 2 or 3 in the morning at which point when she work up in the middle of   night, she had increasing difficulty and shortness of breath and called 911.    She presented to the ER and her symptoms continued.    PAST MEDICAL HISTORY:  Type 2 diabetes, sinusitis, bronchitis, asthma, hiatus   hernia, bulbar polio.    PREVIOUS SURGICAL HISTORY:  Tonsillectomy, cataract extraction, anterior and   posterior repair and robotic bilateral salpingo-oophorectomy.    SOCIAL HISTORY:  The patient is a former smoker.  Occasional alcohol, no drug   use.    FAMILY HISTORY:  Noncontributory.    MEDICATIONS:  See EPIC chart.  Include montelukast, Naproxen, akinesia, Leticia   d'arco p.o., Oxycodone 5/325, metoclopramide 10 mg as needed, Zofran 4 mg   tablets, multivitamin, potassium, chromium, glucosamine, ProAir  90   base 2 puffs every 6 hours as needed for shortness of breath, vitamin D,   coenzyme Q10, probiotic omega 3 fatty acid, loratadine.    ALLERGIES:  ENALAPRIL.    PHYSICAL EXAMINATION:  VITAL SIGNS:  The patient's blood pressure is 120/69, pulse rate is 92,   temperature is 36.6, respiratory rate 22, BMI is 23.2, SpO2 96% on 2 L.  GENERAL:  The patient is well developed, well nourished and anxious.  HEENT:  The patient is normocephalic and atraumatic.  Oropharynx is clear and   moist without exudate or erythema.  Eyes, pupils are equal and reactive   to light.  Conjunctivae are normal.  CARDIOVASCULAR:  S1, S2 plus 0.  PULMONARY:  No wheezes or rales.  Some crackles on the  right lower bases.  ABDOMEN:  Soft and nontender with no distension.  Well-healed incision signs.    The dressings were removed which were clean, dry and intact.  MUSCULOSKELETAL:  The patient has no lower extremity edema.  No evidence of   any DVT.  SKIN:  Warm and dry with no rash.  PSYCHIATRIC:  The patient is anxious but in normal mood and affect.    ASSESSMENT AND PLAN:  The patient is 76-year-old with shortness of breath   within 24 hours of anesthesia and surgery.  1.  Shortness of breath.  Chest x-ray today.  Treat with ceftriaxone for   likely aspiration pneumonia.  Continue O2 per protocol and wean off O2.    Monitor temperature curve.  Monitor CBC.  2.  Leukocytosis.  Likely secondary to surgery.  Repeat in a.m. antibiotics   for aspiration pneumonia.  3.  Hyponatremia.  Replete with sodium.  Replete lytes in a.m.       ____________________________________     Antwon Ling M.D.    RAC / RAYA    DD:  01/27/2017 19:32:50  DT:  01/27/2017 20:44:01    D#:  [id number]  Job#:  [id number]

## 2017-02-01 NOTE — CARE PLAN
Problem: Safety  Goal: Will remain free from falls  Intervention: Assess risk factors for falls  Pt assessed for fall risk. Encouraged to call for assistance.       Problem: Pain Management  Goal: Pain level will decrease to patient’s comfort goal  Intervention: Educate and implement non-pharmacologic comfort measures. Examples: relaxation, distration, play therapy, activity therapy, massage, etc.  Pt with complaints of pain, does not want pain meds. Ice pack provided with positive results

## 2017-02-01 NOTE — PROGRESS NOTES
Bedside report received from night shift, assumed care of pt at 0715. Pt in bed resting comfortably with no s/sx of pain or discomfort.  Pt voiding appropriately. Daughter at bedside. Plan is for speech to come see pt around 1100. Educated pt on NPO status and aspiration precautions. Pt verbalized understanding. Pt calls appropriately for medication or assistance as needed. Call light within reach, all appropriate fall precautions in place and personal belongings within reach; hourly rounding in place. No needs at this time. See flowsheets for further assessment details.

## 2017-02-01 NOTE — PROGRESS NOTES
Hospital Medicine Interval Note  Date of Service:  1/31/2017    Chief Complaint  76 y.o.-year-old female admitted 1/27/2017 with benign cystadenoma of the ovary.    Interval Problem Update  Patient still with significant diarrhea and failed swallow evaluation - unable to swallow pills (salt tabs).  She will have FEES tomorrow for swallow evaluation.  Trop spill likely dt small cardiac strain only, no ekg changes, echo okay and peaked at 0.5.  D/w oncology, cardiology s/o.    Consultants/Specialty  Oncology - Bearden    Disposition  tbd     Review of Systems   Constitutional: Negative for fever and diaphoresis.   HENT: Negative for ear discharge and nosebleeds.    Eyes: Negative for blurred vision and photophobia.   Respiratory: Negative for cough and shortness of breath.    Cardiovascular: Negative for chest pain, claudication and leg swelling.   Gastrointestinal: Positive for diarrhea. Negative for nausea, vomiting and abdominal pain.   Genitourinary: Negative for dysuria and hematuria.   Musculoskeletal: Negative for myalgias and joint pain.   Skin: Negative for itching and rash.   Neurological: Negative for dizziness, sensory change, speech change and headaches.   Psychiatric/Behavioral: Negative for depression and hallucinations. The patient is not nervous/anxious.       Physical Exam Laboratory/Imaging   Filed Vitals:    01/31/17 0328 01/31/17 0800 01/31/17 1551 01/31/17 1951   BP: 147/88 163/83 144/86 143/78   Pulse: 60 71 70 79   Temp: 36.4 °C (97.5 °F) 36.3 °C (97.4 °F) 36.7 °C (98.1 °F) 36.6 °C (97.8 °F)   Resp: 18 20 18 18   Height:       Weight:       SpO2: 93% 94% 94% 94%     Physical Exam   Constitutional: She is oriented to person, place, and time. She appears well-developed and well-nourished. No distress.   HENT:   Head: Normocephalic and atraumatic.   Eyes: Conjunctivae are normal. No scleral icterus.   Neck: Neck supple. No JVD present.   Cardiovascular: Normal rate, regular rhythm, normal heart sounds  and intact distal pulses.  Exam reveals no gallop and no friction rub.    No murmur heard.  Pulmonary/Chest: Effort normal and breath sounds normal. No respiratory distress. She has no wheezes. She exhibits no tenderness.   Abdominal: Bowel sounds are normal. She exhibits no distension and no mass. There is no tenderness.   Musculoskeletal: She exhibits no edema or tenderness.   Neurological: She is alert and oriented to person, place, and time. No cranial nerve deficit.   Skin: Skin is warm and dry. She is not diaphoretic. No erythema. No pallor.   Psychiatric: She has a normal mood and affect. Her behavior is normal.   Nursing note and vitals reviewed.   Lab Results   Component Value Date/Time    WBC 13.1* 01/30/2017 11:42 PM    HEMOGLOBIN 11.2* 01/30/2017 11:42 PM    HEMATOCRIT 33.2* 01/30/2017 11:42 PM    PLATELET COUNT 251 01/30/2017 11:42 PM     Lab Results   Component Value Date/Time    SODIUM 124* 01/30/2017 11:42 PM    POTASSIUM 3.4* 01/30/2017 11:42 PM    CHLORIDE 84* 01/30/2017 11:42 PM    CO2 32 01/30/2017 11:42 PM    GLUCOSE 110* 01/30/2017 11:42 PM    BUN 12 01/30/2017 11:42 PM    CREATININE 0.59 01/30/2017 11:42 PM      Assessment/Plan    * Acute respiratory failure with hypoxia (CMS-HCC)  Assessment & Plan  Off oxygen, no sob      Anxiety  Assessment & Plan  Ativan as needed      Dyspnea  Assessment & Plan  Suspect aspiration as contribution  slp eval      Hypokalemia  Assessment & Plan  Replace in IVF as swallowing KCl tabs hard even prior to failing swallow eval      Hyponatremia  Assessment & Plan  Replete with IVF.      Elevated troponin  Assessment & Plan  Minimal elevation - strain not cardiac event  Cardiology s/o      Asthma  Assessment & Plan  svns as needed      Cystadenoma of right ovary  Assessment & Plan  Benign pathology      Dysphagia  Assessment & Plan  FEES tomorrow         Labs reviewed, Radiology images reviewed and Medications reviewed  Painting catheter: No Painting      DVT  Prophylaxis: Enoxaparin (Lovenox)      Antibiotics: Treating active infection/contamination beyond 24 hours perioperative coverage  Assessed for rehab: Patient was assess for and/or received rehabilitation services during this hospitalization

## 2017-02-01 NOTE — THERAPY
"Physical Therapy Evaluation completed.   Bed Mobility:  Supine to Sit: Supervised  Transfers: Sit to Stand: Stand by Assist  Gait: Level Of Assist: Stand by Assist with No Equipment Needed       Plan of Care: Patient with no further skilled PT needs in the acute care setting at this time  Discharge Recommendations: Equipment: No Equipment Needed. Post-acute therapy recommended after discharged home.    See \"Rehab Therapy-Acute\" Patient Summary Report for complete documentation.     "

## 2017-02-01 NOTE — ASSESSMENT & PLAN NOTE
Up to 131 with IVF, now off IVF and taking PO salt tabs  Recheck na level in am and if okay, dc home

## 2017-02-02 ENCOUNTER — PATIENT OUTREACH (OUTPATIENT)
Dept: HEALTH INFORMATION MANAGEMENT | Facility: OTHER | Age: 77
End: 2017-02-02

## 2017-02-02 VITALS
TEMPERATURE: 99 F | DIASTOLIC BLOOD PRESSURE: 83 MMHG | HEART RATE: 66 BPM | BODY MASS INDEX: 21.66 KG/M2 | OXYGEN SATURATION: 92 % | WEIGHT: 117.73 LBS | SYSTOLIC BLOOD PRESSURE: 131 MMHG | RESPIRATION RATE: 17 BRPM | HEIGHT: 62 IN

## 2017-02-02 LAB
ANION GAP SERPL CALC-SCNC: 8 MMOL/L (ref 0–11.9)
BUN SERPL-MCNC: 13 MG/DL (ref 8–22)
CALCIUM SERPL-MCNC: 9 MG/DL (ref 8.5–10.5)
CHLORIDE SERPL-SCNC: 95 MMOL/L (ref 96–112)
CO2 SERPL-SCNC: 33 MMOL/L (ref 20–33)
CREAT SERPL-MCNC: 0.55 MG/DL (ref 0.5–1.4)
GFR SERPL CREATININE-BSD FRML MDRD: >60 ML/MIN/1.73 M 2
GLUCOSE SERPL-MCNC: 98 MG/DL (ref 65–99)
POTASSIUM SERPL-SCNC: 4.2 MMOL/L (ref 3.6–5.5)
SODIUM SERPL-SCNC: 136 MMOL/L (ref 135–145)

## 2017-02-02 PROCEDURE — A9270 NON-COVERED ITEM OR SERVICE: HCPCS | Performed by: HOSPITALIST

## 2017-02-02 PROCEDURE — A9270 NON-COVERED ITEM OR SERVICE: HCPCS | Performed by: SPECIALIST

## 2017-02-02 PROCEDURE — 700111 HCHG RX REV CODE 636 W/ 250 OVERRIDE (IP): Performed by: INTERNAL MEDICINE

## 2017-02-02 PROCEDURE — A9270 NON-COVERED ITEM OR SERVICE: HCPCS | Performed by: INTERNAL MEDICINE

## 2017-02-02 PROCEDURE — 700102 HCHG RX REV CODE 250 W/ 637 OVERRIDE(OP): Performed by: HOSPITALIST

## 2017-02-02 PROCEDURE — 700102 HCHG RX REV CODE 250 W/ 637 OVERRIDE(OP): Performed by: INTERNAL MEDICINE

## 2017-02-02 PROCEDURE — 700102 HCHG RX REV CODE 250 W/ 637 OVERRIDE(OP): Performed by: SPECIALIST

## 2017-02-02 PROCEDURE — 99239 HOSP IP/OBS DSCHRG MGMT >30: CPT | Performed by: INTERNAL MEDICINE

## 2017-02-02 RX ORDER — SODIUM CHLORIDE 1 G/1
1 TABLET ORAL
Qty: 42 TAB | Refills: 0 | Status: SHIPPED | OUTPATIENT
Start: 2017-02-02 | End: 2017-02-14

## 2017-02-02 RX ORDER — PREDNISONE 10 MG/1
TABLET ORAL
Qty: 30 TAB | Refills: 0 | Status: SHIPPED | OUTPATIENT
Start: 2017-02-02 | End: 2017-02-14

## 2017-02-02 RX ORDER — ASPIRIN 81 MG/1
81 TABLET, CHEWABLE ORAL DAILY
Qty: 100 TAB | COMMUNITY
Start: 2017-02-02 | End: 2018-06-09

## 2017-02-02 RX ADMIN — SODIUM CHLORIDE TAB 1 GM 1 G: 1 TAB at 09:31

## 2017-02-02 RX ADMIN — PREDNISONE 50 MG: 50 TABLET ORAL at 09:33

## 2017-02-02 RX ADMIN — ANTACID TABLETS 500 MG: 500 TABLET, CHEWABLE ORAL at 07:04

## 2017-02-02 RX ADMIN — ALBUTEROL SULFATE 2 PUFF: 90 AEROSOL, METERED RESPIRATORY (INHALATION) at 08:31

## 2017-02-02 ASSESSMENT — PAIN SCALES - GENERAL: PAINLEVEL_OUTOF10: 0

## 2017-02-02 NOTE — PROGRESS NOTES
Patient discharged home with daughter. PIV removed. Reviewed discharge instructions and prescriptions with patient. Patient verbalized understanding. Questions answered.

## 2017-02-02 NOTE — CARE PLAN
Problem: Safety  Goal: Will remain free from injury  Outcome: PROGRESSING AS EXPECTED  Intervention: Provide assistance with mobility  - up self to the bathroom with steady gait.   Intervention: Educate patient and significant other/support system about adaptive mobility strategies and safe transfers  -      Problem: Psychosocial Needs:  Goal: Level of anxiety will decrease  Outcome: PROGRESSING AS EXPECTED  Pt is anxious; Updated patient on the plan of care and verbalized undestanding  Intervention: Identify and develop with patient strategies to cope with anxiety triggers  -  Intervention: Collaborate with Interdisciplinary Team including Psychologist/Behavioral Health Team  -

## 2017-02-02 NOTE — PROGRESS NOTES
Pt is alert and oriented X4, up to the bathroom with steady gait. Saline locked IV. Pt is anxious. Reviewed and updated pt on he plan of care and verbalized understanding.  Denies any pain as of this time. Pt is going home syvlester with Repeat BMP Lab in am.  Pt calls appropriately for assistance, Hourly rounding in place. Call light within reach.

## 2017-02-02 NOTE — PROGRESS NOTES
"  Gyn Onc Visit    HD#:   2     S: Feeling better, stronger, pain well controlled, less SOB, ambulating independently    O:   /64 mmHg  Pulse 80  Temp(Src) 36.4 °C (97.5 °F)  Resp 18  Ht 1.575 m (5' 2\")  Wt 53.4 kg (117 lb 11.6 oz)  BMI 21.53 kg/m2  SpO2 94%  Breastfeeding? No     Gen: A&O x3, NAD              Chest: RRR, +cough, LS diminished t/o, stridor improved              Abd: soft, nontender, nondistended              Pelvis: deferred (Painting)              Ext: no edema, nontender, + pulses bilaterally    Labs:  Recent Labs      01/29/17   2344  01/30/17   2342  01/31/17   2340   WBC  11.2*  13.1*  12.0*   RBC  3.91*  3.73*  4.03*   HEMOGLOBIN  11.8*  11.2*  12.4   HEMATOCRIT  34.7*  33.2*  36.6*   MCV  88.7  89.0  90.8   MCH  30.7  30.0  30.8   RDW  38.2  39.2  42.2   PLATELETCT  228  251  294   MPV  9.8  9.6  9.2   NEUTSPOLYS  79.60*  80.10*  79.80*   LYMPHOCYTES  4.40*  5.50*  5.40*   MONOCYTES  15.50*  13.70*  14.10*   EOSINOPHILS  0.00  0.20  0.00   BASOPHILS  0.10  0.10  0.20     Lab Results   Component Value Date/Time    SODIUM 131* 01/31/2017 11:40 PM    POTASSIUM 3.5* 01/31/2017 11:40 PM    CHLORIDE 91* 01/31/2017 11:40 PM    CO2 30 01/31/2017 11:40 PM    GLUCOSE 106* 01/31/2017 11:40 PM    BUN 14 01/31/2017 11:40 PM    CREATININE 0.57 01/31/2017 11:40 PM      Lab Results   Component Value Date/Time    PT 12.3 01/23/2017 12:59 PM    INR 0.89 01/23/2017 12:59 PM        A/P:  s/p resection of benign serous cystadenoma and repair of rectocele and cystocele by Dr. Bearden on 1/26  Admit for SOB/dyspnea on 1/27    1.  Hyponatremia: Improved to 131 with TKO of IVF, PO salt TID per Hospitalist  2.  Hypokalemia: Improved to 3.5 on lasix and 40 PO KCl daily - will defer to hospitalist  3.  Aspiration pneumonia: +stridor and wheezes much improved continues on Flagyl (Started 1/28)  and Ceftriaxone (Started on 1/27), hx Asthma  4.  Leukocytosis: Improved to 12 afebrile, will monitor: IV abx " continue  5.  Pulmonary edema: Daily lasix continues, IVF at TKO for abx  6.  Elevated troponin: Resolved. Improved to 0.31 from 0.55 - s/p rapid response 1/29, negative PE  7.  Discharge planning - she is surgically improving as expected, voiding after d/c of Painting, she was due to follow up in office today - she will call upon discharge to rescheduled for post op follow up.  Will defer to Hospitalist to d/c when medically stable.  Thank you    JENNY Mims.  [facility]  Office of Dr. Liborio Bearden  P: [phone]  F: [phone]

## 2017-02-02 NOTE — PROGRESS NOTES
"Hospital Medicine Interval Note  Date of Service:  2/1/2017    Chief Complaint  76 y.o.-year-old female admitted 1/27/2017 with benign cystadenoma of the ovary.    Interval Problem Update  Patient had resolution of diarrhea overnight without intervention.  FEES done today and she has not issues with aspiration - regular diet resumed.  IVF stopped and PO salt supplementation resumed.  Explained to patient I wanted one more Na check in the am with her off IVF and if okay, would happily send her home.  [facility] and requests dc home prior to noon.  She requests no more \"damn injections or fluids.\"  Lovenox prophylaxis stopped and already had dc'd IVF.  After further discussion, patient and daughter happy about discharge planning and agreeable to proceed.    Consultants/Specialty  Oncology - Bearden    Disposition  tbd     Review of Systems   Constitutional: Negative for fever and diaphoresis.   HENT: Negative for ear discharge and nosebleeds.    Eyes: Negative for blurred vision and photophobia.   Respiratory: Negative for cough and shortness of breath.    Cardiovascular: Negative for chest pain, claudication and leg swelling.   Gastrointestinal: Negative for nausea, vomiting, abdominal pain and diarrhea.   Genitourinary: Negative for dysuria and hematuria.   Musculoskeletal: Negative for myalgias and joint pain.   Skin: Negative for itching and rash.   Neurological: Negative for dizziness, sensory change, speech change and headaches.   Psychiatric/Behavioral: Negative for depression and hallucinations. The patient is not nervous/anxious.       Physical Exam Laboratory/Imaging   Filed Vitals:    02/01/17 0407 02/01/17 0545 02/01/17 0800 02/01/17 1600   BP: 161/94 110/82 127/85 119/76   Pulse: 88 85 77 86   Temp: 36.5 °C (97.7 °F)  36.8 °C (98.2 °F) 36.8 °C (98.2 °F)   Resp: 20  20 20   Height:       Weight:   53.4 kg (117 lb 11.6 oz)    SpO2: 92%  93% 95%     Physical Exam "   Constitutional: She is oriented to person, place, and time. She appears well-developed and well-nourished. No distress.   HENT:   Head: Normocephalic and atraumatic.   Eyes: Conjunctivae are normal. No scleral icterus.   Neck: Neck supple. No JVD present.   Cardiovascular: Normal rate, regular rhythm, normal heart sounds and intact distal pulses.  Exam reveals no gallop and no friction rub.    No murmur heard.  Pulmonary/Chest: Effort normal and breath sounds normal. No respiratory distress. She has no wheezes. She exhibits no tenderness.   Abdominal: Bowel sounds are normal. She exhibits no distension and no mass. There is no tenderness.   Musculoskeletal: She exhibits no edema or tenderness.   Neurological: She is alert and oriented to person, place, and time. No cranial nerve deficit.   Skin: Skin is warm and dry. She is not diaphoretic. No erythema. No pallor.   Psychiatric: She has a normal mood and affect. Her behavior is normal.   Nursing note and vitals reviewed.   Lab Results   Component Value Date/Time    WBC 12.0* 01/31/2017 11:40 PM    HEMOGLOBIN 12.4 01/31/2017 11:40 PM    HEMATOCRIT 36.6* 01/31/2017 11:40 PM    PLATELET COUNT 294 01/31/2017 11:40 PM     Lab Results   Component Value Date/Time    SODIUM 131* 01/31/2017 11:40 PM    POTASSIUM 3.5* 01/31/2017 11:40 PM    CHLORIDE 91* 01/31/2017 11:40 PM    CO2 30 01/31/2017 11:40 PM    GLUCOSE 106* 01/31/2017 11:40 PM    BUN 14 01/31/2017 11:40 PM    CREATININE 0.57 01/31/2017 11:40 PM      Assessment/Plan    * Acute respiratory failure with hypoxia (CMS-HCC)  Assessment & Plan  Off oxygen, no sob      Anxiety  Assessment & Plan  Ativan as needed      Dyspnea  Assessment & Plan  Suspect aspiration as contribution  slp eval      Hypokalemia  Assessment & Plan  Replace in IVF as swallowing KCl tabs hard even prior to failing swallow eval      Hyponatremia  Assessment & Plan  Up to 131 with IVF, now off IVF and taking PO salt tabs  Recheck na level in am  and if okay, dc home        Elevated troponin  Assessment & Plan  Minimal elevation - strain not cardiac event  Cardiology s/o      Asthma  Assessment & Plan  svns as needed      Cystadenoma of right ovary  Assessment & Plan  Benign pathology      Dysphagia  Assessment & Plan  FEES completed, passed with flying colors - regular diet resumed           Labs reviewed, Radiology images reviewed and Medications reviewed  Painting catheter: No Painting      DVT Prophylaxis: Enoxaparin (Lovenox)      Antibiotics: Treating active infection/contamination beyond 24 hours perioperative coverage  Assessed for rehab: Patient was assess for and/or received rehabilitation services during this hospitalization

## 2017-02-02 NOTE — DISCHARGE INSTRUCTIONS
Discharge Instructions    Discharged to home by car with relative. Discharged via wheelchair, hospital escort: Yes.  Special equipment needed: Not Applicable    Be sure to schedule a follow-up appointment with your primary care doctor or any specialists as instructed.     Discharge Plan:   Diet Plan: Discussed  Activity Level: Discussed  Confirmed Follow up Appointment: Patient to Call and Schedule Appointment  Confirmed Symptoms Management: Discussed  Medication Reconciliation Updated: Yes  Influenza Vaccine Indication: Patient Refuses    I understand that a diet low in cholesterol, fat, and sodium is recommended for good health. Unless I have been given specific instructions below for another diet, I accept this instruction as my diet prescription.   Other diet: as tolerated    Special Instructions: None    · Is patient discharged on Warfarin / Coumadin?   No     · Is patient Post Blood Transfusion?  No    Depression / Suicide Risk    As you are discharged from this [facility] facility, it is important to learn how to keep safe from harming yourself.    Recognize the warning signs:  · Abrupt changes in personality, positive or negative- including increase in energy   · Giving away possessions  · Change in eating patterns- significant weight changes-  positive or negative  · Change in sleeping patterns- unable to sleep or sleeping all the time   · Unwillingness or inability to communicate  · Depression  · Unusual sadness, discouragement and loneliness  · Talk of wanting to die  · Neglect of personal appearance   · Rebelliousness- reckless behavior  · Withdrawal from people/activities they love  · Confusion- inability to concentrate     If you or a loved one observes any of these behaviors or has concerns about self-harm, here's what you can do:  · Talk about it- your feelings and reasons for harming yourself  · Remove any means that you might use to hurt yourself (examples: pills, rope, extension cords,  firearm)  · Get professional help from the community (Mental Health, Substance Abuse, psychological counseling)  · Do not be alone:Call your Safe Contact- someone whom you trust who will be there for you.  · Call your local CRISIS HOTLINE 293-4259 or 737-659-8869  · Call your local Children's Mobile Crisis Response Team [place] (714) 947-1080 or www.[employer]  · Call the toll free National Suicide Prevention Hotlines   · National Suicide Prevention Lifeline 385-232-YIRC (4315)  · National Hope Line Network 800-SUICIDE (431-1679)

## 2017-02-02 NOTE — THERAPY
"Speech Language Therapy FEES completed.  Functional Status: FEES procedure explained to patient and patient agreed to proceed. Patient tolerated procedure well. Visualization of the larynx revealed sluggish right arytenoid and true vocal fold little to no movement of left vocal fold resulting in extremely limited abduction (opening) of vocal folds/airway, and little to no visualization of subglottic space. Presentation of PO consisted of ice, purees, nectars, thin liquids and soft solids. The patient presented with mild oropharyngeal dysphagia characterized by delayed onset of swallow (pt noted to hold bolus in mouth), weak hyo-laryngeal elevation and incomplete epiglottic retraction. When patient was noted to hold bolus in her mouth prior to swallowing she had intermittent penetration to the laryngeal rim with reflexive throat clear noted. When patient was cued to “swallow quick” no episode of penetration occurred. Patient was however noted to have intermittent throat clear which did not appear directly related to penetration or suspected aspiration. Patient was trained on swallow strategies with “good” follow through noted during remainder of FEES. At this time, recommend regular/thin liquid diet with swallow strategies. Highly recommend  ENT referral due to findings on FEES concerning for decreased airway. Hospitalist aware of FEES results and airway.     Recommendations - Diet:  Regular                          Strategies: Direct supervision during meals and Head of Bed at 90 Degrees                          Medication Administration: Whole with Liquid Wash, Crush all Medications in Puree, Float Whole with Puree (as tolerated by patient )  Plan of Care: Will benefit from Speech Therapy 2 times per week    See \"Rehab Therapy-Acute\" Patient Summary Report for complete documentation.   "

## 2017-02-05 NOTE — DISCHARGE SUMMARY
DATE OF ADMISSION:  01/27/2017    DATE OF DISCHARGE:  02/02/2017    DIAGNOSES:  Respiratory failure with hypoxia, anxiety, dyspnea, hypokalemia,   hyponatremia, slight elevation of troponin, asthma, cystadenoma of right   ovary, benign pathology, dysphagia.    REASON FOR HOSPITALIZATION:  Patient is a 76-year-old female who was admitted   by gynecological oncology, Dr. Ling.  Patient with robotic assisted   bilateral salpingo-oophorectomy and anterior posterior vaginal repair,   presented with chest discomfort, phlegm in her throat.  Patient was admitted   to the medical floor, she was seen by gynecological oncology Dr. Ling and   Dr. Bearden; medicine, Dr. Willoughby and cardiology, Dr. Gillis.  Patient had a   troponin elevation, peaking at 0.55 and trending back down to 0.31.  She had   resolution of cardiac symptoms likely was having a slight cardiac strain given   the recent surgery.  She also had significant hyponatremia, lowest being a   sodium of 121.  She was having significant diarrhea during this period and   once the diarrhea resolved, her sodium trended back to normal on the day of   discharge 136.  Patient had swallow evaluation as well as speech, fiberoptic   ____ and did well without any signs of aspiration.  Patient was monitored an   additional 24 hours after the speech to make sure she did not have any issues   with swallowing and that her sodium remained within normal limits.  At this   point, patient is appropriate for discharge home, should follow up with Dr. Bearden as already scheduled and should follow up with her primary care   practitioner, Dr. Alan Singh in 2 weeks.      DISCHARGE MEDICATIONS:  Aspirin 81 mg p.o. daily; prednisone taper 40 mg p.o.   daily for 3 days, then 30 mg daily for 3 days, then 20 daily for 3 days, then   10 mg daily for 3 days, then discontinue as well as salt tablets 1 g t.i.d.   with meals for 14 days and discontinue; Aleve 220 p.o. daily p.r.n. pain;   chromium 1  mg p.o. daily; Claritin 1 tab p.o. daily; Coenzyme Q10 1 tab p.o.   daily; ____ 1 tab p.o. daily; glutamine 1 tab p.o. daily; Reglan 10 mg every 6   hours p.r.n. nausea, vomiting; Singular 10 mg p.o. daily; multivitamin tab   p.o. daily; omega-3 fatty acid 1 tab p.o. daily; Zofran 4 mg every 6 hours   p.r.n. nausea, vomiting; Percocet 5/325 every 4 hours p.r.n. pain; potassium   99 mg tablets p.o. daily; ProAir 2 puffs every 6 hours as needed for shortness   of breath; probiotic 1 tab p.o. daily; vitamin D3 4000 units p.o. daily.    DIET:  As tolerated.    ACTIVITY:  As tolerated.    Discharge instructions and followup were explained to the patient, agreeable   with discharge.  Discharge process lasted approximately 38 minutes.       ____________________________________     DO JUAN Renae / RAYA    DD:  02/03/2017 17:24:00  DT:  02/04/2017 16:26:25    D#:  [id number]  Job#:  [id number]

## 2017-02-07 ENCOUNTER — OFFICE VISIT (OUTPATIENT)
Dept: OTHER | Facility: IMAGING CENTER | Age: 77
End: 2017-02-07

## 2017-02-07 DIAGNOSIS — J45.20 MILD INTERMITTENT ASTHMA WITHOUT COMPLICATION: ICD-10-CM

## 2017-02-07 DIAGNOSIS — Z91.89: ICD-10-CM

## 2017-02-07 DIAGNOSIS — J38.3 VOCAL CORD DYSFUNCTION: Primary | ICD-10-CM

## 2017-02-07 PROCEDURE — 97811 ACUP 1/> W/O ESTIM EA ADD 15: CPT | Performed by: FAMILY MEDICINE

## 2017-02-07 PROCEDURE — 97810 ACUP 1/> WO ESTIM 1ST 15 MIN: CPT | Performed by: FAMILY MEDICINE

## 2017-02-07 PROCEDURE — 99213 OFFICE O/P EST LOW 20 MIN: CPT | Mod: 25 | Performed by: FAMILY MEDICINE

## 2017-02-07 NOTE — MR AVS SNAPSHOT
Stephanie Guerrero   2017 11:00 AM   Office Visit   MRN: [id number]    Department:  Acupuncture Med   Dept Phone:  [phone]    Description:  Female : 1940   Provider:  Sushant Ferrera M.D.           Allergies as of 2017     Allergen Noted Reactions    Enalapril 2015   Rash     [id number]      Vital Signs     Smoking Status                   Former Smoker           Basic Information     Date Of Birth Sex Race Ethnicity Preferred Language    1940 Female White Non- English      Your appointments     2017  9:20 AM   Established Patient with Alan Singh M.D.   [facility] [address]    [address]  [address]  [place] [address] [phone]           You will be receiving a confirmation call a few days before your appointment from our automated call confirmation system.            2017 12:40 PM   HOSPITAL FOLLOW UP with LUIS Littlejohn   [facility] (--)    [address], [address]  [place] [id number] [phone]              Problem List              ICD-10-CM Priority Class Noted - Resolved    Poliomyelitis, bulbar A80.30   Unknown - Present    Vitamin D deficiency E55.9   2015 - Present    Bladder prolapse, female, acquired N81.10   2015 - Present    Moderate persistent asthma without complication J45.40   2015 - Present    Anxiety F41.9   2016 - Present    IGT (impaired glucose tolerance) R73.02   10/14/2016 - Present    Ovarian mass N83.9   11/10/2016 - Present    Unspecified prolapse of vaginal walls N81.10   2017 - Present    Dyspnea R06.00   2017 - Present    Hypokalemia E87.6   2017 - Present    Hyponatremia E87.1   2017 - Present    Elevated troponin R79.89   2017 - Present    Acute respiratory failure with hypoxia (CMS-HCC) J96.01   2017 - Present    Asthma J45.909   2017 - Present    Cystadenoma of right ovary D27.0    1/29/2017 - Present    Dysphagia R13.10   1/31/2017 - Present      Health Maintenance        Date Due Completion Dates    IMM DTaP/Tdap/Td Vaccine (1 - Tdap) 6/30/1959 ---    MAMMOGRAM 6/30/1980 ---    IMM INFLUENZA (1) 9/1/2016 ---    BONE DENSITY 5/2/2017 5/2/2012    COLON CANCER SCREENING ANNUAL FIT 10/10/2017 10/10/2016            Current Immunizations     13-VALENT PCV PREVNAR 9/21/2016    Pneumococcal polysaccharide vaccine (PPSV-23) 10/11/2010    SHINGLES VACCINE 4/10/2012      Below and/or attached are the medications your provider expects you to take. Review all of your home medications and newly ordered medications with your provider and/or pharmacist. Follow medication instructions as directed by your provider and/or pharmacist. Please keep your medication list with you and share with your provider. Update the information when medications are discontinued, doses are changed, or new medications (including over-the-counter products) are added; and carry medication information at all times in the event of emergency situations     Allergies:  ENALAPRIL - Rash               Medications  Valid as of: February 07, 2017 - 12:04 PM    Generic Name Brand Name Tablet Size Instructions for use    Albuterol Sulfate (Aero Soln) PROAIR  (90 BASE) MCG/ACT Inhale 2 Puffs by mouth every 6 hours as needed for Shortness of Breath.        Aspirin (Chew Tab) ASA 81 MG Take 1 Tab by mouth every day.        Cholecalciferol   Take 4,000 Units by mouth every day.        Chromium (Cap) Chromium 1 MG Take 1 Cap by mouth every day.        Coenzyme Q10   Take 1 Tab by mouth every day.        Glutamine   Take 1 Tab by mouth every day.        Loratadine   Take 1 Tab by mouth every day.        Metoclopramide HCl (Tab) REGLAN 10 MG Take 1 Tab by mouth every 6 hours as needed.        Misc Natural Products   Take 1 Each by mouth 1 time daily as needed. tincture        Montelukast Sodium (Tab) SINGULAIR 10 MG Take 10 mg by mouth every  day.        Multiple Vitamin (Tab) THERAGRAN  Take 1 Tab by mouth every day.        Naproxen Sodium (Cap) Naproxen Sodium 220 MG Take 1 Cap by mouth 1 time daily as needed.        Omega-3 Fatty Acids   Take 1 Tab by mouth every day.        Ondansetron HCl (Tab) ZOFRAN 4 MG Take 1 Tab by mouth every 6 hours as needed.        Oxycodone-Acetaminophen (Tab) PERCOCET 5-325 MG Take 1 Tab by mouth every four hours as needed.        Leticia D Arco   Take 1 Each by mouth 3 times a day. Tincture        Potassium (Tab) Potassium 99 MG Take 1 Tab by mouth every day.        PredniSONE (Tab) DELTASONE 10 MG 40mg po qday x 3 days then   30 mg po qday x 3 days  20 mp po qday x 3 days   10 mp po qday x 3 days        Probiotic Product   Take 1 Tab by mouth every day.        Sodium Chloride (Tab) SALT 1 GM Take 1 Tab by mouth 3 times a day, with meals for 14 days.        .                 Medicines prescribed today were sent to:     [id number] - [place] - [address]    [address] [place] [address]    Phone: [phone] Fax: [phone]    Open 24 Hours?: No      Medication refill instructions:       If your prescription bottle indicates you have medication refills left, it is not necessary to call your provider’s office. Please contact your pharmacy and they will refill your medication.    If your prescription bottle indicates you do not have any refills left, you may request refills at any time through one of the following ways: The online [employer] system (except Urgent Care), by calling your provider’s office, or by asking your pharmacy to contact your provider’s office with a refill request. Medication refills are processed only during regular business hours and may not be available until the next business day. Your provider may request additional information or to have a follow-up visit with you prior to refilling your medication.   *Please Note: Medication refills are assigned a new Rx number when refilled  electronically. Your pharmacy may indicate that no refills were authorized even though a new prescription for the same medication is available at the pharmacy. Please request the medicine by name with the pharmacy before contacting your provider for a refill.        Other Notes About Your Plan     Patient is enrolled in [email or web address] [email or web address] with Dr. Mally Coles.           MyChart Access Code: Activation code not generated  Current ePAR Status: Active

## 2017-02-07 NOTE — PROGRESS NOTES
"[facility] Medical Acupuncture Progress Note  [address] [place] [address]  Dept: [phone]      Patient Name: Stephanie Guerrero   MRN: [id number]  YOB: 1940  PCP: Ana Pt States None  Date of Service: 2/7/2017 10:58 AM    JONA Brown - asthma exacerbation, rectal pain, vocal cord dysfunction   HPI She had a good surgery with Dr. Bearden.  She states that she was sent home too soon.  She had to be readmitted to the hospital  - stayed 5 days - a had a tropinin bump.       She's scared and teary.  She has a girlfriend staying with her.   She was hoping to be recovered from the surgery by now.   She states that she sees Dr. Bearden again this afternoon.      She'd like work on her asthma and her brain.  She feels like she's really having trouble breathing.  She states that she has a \"frog\" in her throat.  She notes that  She has only a small opening in her vocal cords and wonders if this is worsening her breathing    She wants sense of humor and ld de vrie.  She has no discomfort in her belly.  She states that the ovarian mass was benign.  She has pain in the rectum.      Would like help recovering from general Anesthetic           ROS She likes to take supplements / vitamins  She's over due for a checkup .  Maintains DM with diet.    Favorite color - aqua - makes her happy.  .  Natural colors.  Summer - likes the sunshine.   Can get too hot for her - past 80s.    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  [place], [place], [place]  2 kids, 2 grandchildren.    Born: [place], [place].  Time -  12:37PM.    Likes to laugh. Likes games.   Lives alone - has dogs.  Did have dogs die on her in 2015  Second hand smoke exposure in the 1970  Had acupuncture in the past in [place], [place] - for an allergy rash  Likes to knit. Used to bowl   [facility] Past Medical History   Diagnosis Date   • MEDICAL HOME 4/19/2013   • Impacted ear wax 8/19/2013   • Sinusitis 10/7/2014   • Bronchitis 10/7/2014   • Asthma  " "  • Elevated blood pressure 6/5/2015     Has been monitoring blood pressure at home, readings typically 97/70. 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 BP elevated in office today, she suspects this is secondary to office visit and some recent stress and will continue home monitoring. She limits sodium and is physically active.    • Hiatus hernia syndrome    • Breath shortness      asthma and \"cold weather\"   • Anesthesia      bulbar polio L side of throat-\"no control of liquids on L side, choke easily\"   • Anesthesia      daughter-hx difficulty waking   • Bulbar polio      Affects left side of throat, dysphasia.  (age 16)   • Diabetes (CMS-HCC)      Past Surgical History   Procedure Laterality Date   • Other       urinary bladder prolapse   • Tonsillectomy     • Cataract extraction with iol       both eyes   • Hysterectomy robotic xi  1/26/2017     Procedure: ROBOTIC XI BSO, BILATERAL URETEROLYSIS ;  Surgeon: Liborio Bearden M.D.;  Location: SURGERY [facility];  Service:    • Anterior repair  1/26/2017     Procedure: ANTERIOR REPAIR FOR ANTERIOR COLPORRHAPHY, POSTERIOR REPAIR;  Surgeon: Liborio Bearden M.D.;  Location: SURGERY [facility];  Service:        Social History     Social History   • Marital Status:      Spouse Name: N/A   • Number of Children: 2   • Years of Education: N/A     Occupational History   • retired- self employed      Social History Main Topics   • Smoking status: Former Smoker -- 0.25 packs/day for 5 years     Types: Cigarettes     Quit date: 01/01/1967   • Smokeless tobacco: Never Used      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 42 Tab 0   • aspirin (ASA) 81 MG Chew Tab chewable tablet Take 1 Tab by mouth every day. 100 Tab    • predniSONE (DELTASONE) 10 MG Tab 40mg po qday x 3 days then   30 mg po qday x 3 days  20 mp po qday x 3 days   10 mp po qday x 3 days 30 Tab 0   • montelukast (SINGULAIR) 10 MG Tab Take 10 mg by mouth every day.     • Naproxen Sodium (ALEVE) 220 MG Cap Take 1 Cap by mouth 1 time daily as needed.     • Misc Natural Products (ECHINACEA GOLDENSEAL PLUS PO) Take 1 Each by mouth 1 time daily as needed. tincture     • GIAN D ARCO PO Take 1 Each by mouth 3 times a day. Tincture     • oxycodone-acetaminophen (PERCOCET) 5-325 MG Tab Take 1 Tab by mouth every four hours as needed. 60 Tab 0   • metoclopramide (REGLAN) 10 MG Tab Take 1 Tab by mouth every 6 hours as needed. 30 Tab 0   • ondansetron (ZOFRAN) 4 MG Tab tablet Take 1 Tab by mouth every 6 hours as needed. 30 Tab 0   • multivitamin (THERAGRAN) Tab Take 1 Tab by mouth every day.     • Potassium 99 MG Tab Take 1 Tab by mouth every day.     • Chromium 1 MG Cap Take 1 Cap by mouth every day.     • L-GLUTAMINE PO Take 1 Tab by mouth every day.     • PROAIR  (90 BASE) MCG/ACT Aero Soln inhalation aerosol Inhale 2 Puffs by mouth every 6 hours as needed for Shortness of Breath. 1 Inhaler 3   • Cholecalciferol (VITAMIN D-3 PO) Take 4,000 Units by mouth every day.     • Coenzyme Q10 (CO Q 10 PO) Take 1 Tab by mouth every day.     • Probiotic Product (PROBIOTIC PO) Take 1 Tab by mouth every day.     • Omega-3 Fatty Acids (OMEGA 3 PO) Take 1 Tab by mouth every day.     • Loratadine (CLARITIN PO) Take 1 Tab by mouth every day.       No current facility-administered medications on file prior to visit.      ALLERGIES Allergies   Allergen Reactions   • Enalapril Rash      [id number]       PE Pulses - not examined today  Tongue - not examined today     Assesment [place], weak body  Stagnation [place]    [place] Encounter Diagnoses   Name Primary?   • Vocal cord dysfunction Yes   • Mild intermittent asthma without complication    • History of complications due to general anesthesia                   Plan Set 1: Lona Chavez 3:6 - LLE gustavo  Set 2: BRINDA, ESM YT GV20 Kristel Palmer  Set 3: RUE PC6     15 min spent face to face, not including procedure      Sushant Ferrera M.D.

## 2017-02-08 NOTE — PATIENT INSTRUCTIONS
The side effects of Acupuncture needle insertion include: minor bruising, bleeding, or pain at the site of needle insertion.  If more worrisome symptoms, such as continued bleeding, severe bruising, or continue pain or altered sensation persist, please contact [facility] office @ [phone]

## 2017-02-10 ENCOUNTER — OFFICE VISIT (OUTPATIENT)
Dept: CARDIOLOGY | Facility: MEDICAL CENTER | Age: 77
End: 2017-02-10
Payer: MEDICARE

## 2017-02-10 VITALS
BODY MASS INDEX: 21.27 KG/M2 | DIASTOLIC BLOOD PRESSURE: 80 MMHG | WEIGHT: 115.6 LBS | HEIGHT: 62 IN | SYSTOLIC BLOOD PRESSURE: 140 MMHG | HEART RATE: 86 BPM | OXYGEN SATURATION: 94 %

## 2017-02-10 DIAGNOSIS — R79.89 ELEVATED TROPONIN: ICD-10-CM

## 2017-02-10 DIAGNOSIS — J96.01 ACUTE RESPIRATORY FAILURE WITH HYPOXIA (HCC): ICD-10-CM

## 2017-02-10 PROCEDURE — 99214 OFFICE O/P EST MOD 30 MIN: CPT | Performed by: NURSE PRACTITIONER

## 2017-02-10 PROCEDURE — G8432 DEP SCR NOT DOC, RNG: HCPCS | Performed by: NURSE PRACTITIONER

## 2017-02-10 PROCEDURE — G8484 FLU IMMUNIZE NO ADMIN: HCPCS | Performed by: NURSE PRACTITIONER

## 2017-02-10 PROCEDURE — 4040F PNEUMOC VAC/ADMIN/RCVD: CPT | Performed by: NURSE PRACTITIONER

## 2017-02-10 PROCEDURE — 1111F DSCHRG MED/CURRENT MED MERGE: CPT | Performed by: NURSE PRACTITIONER

## 2017-02-10 PROCEDURE — 1101F PT FALLS ASSESS-DOCD LE1/YR: CPT | Performed by: NURSE PRACTITIONER

## 2017-02-10 PROCEDURE — G8419 CALC BMI OUT NRM PARAM NOF/U: HCPCS | Performed by: NURSE PRACTITIONER

## 2017-02-10 PROCEDURE — 1036F TOBACCO NON-USER: CPT | Performed by: NURSE PRACTITIONER

## 2017-02-10 ASSESSMENT — ENCOUNTER SYMPTOMS
DIARRHEA: 0
VOMITING: 0
PND: 0
DIZZINESS: 0
SENSORY CHANGE: 0
WHEEZING: 1
NAUSEA: 0
HEARTBURN: 0
MUSCULOSKELETAL NEGATIVE: 1
HEADACHES: 0
HEMOPTYSIS: 0
SPEECH CHANGE: 0
BLURRED VISION: 0
STRIDOR: 0
SORE THROAT: 0
WEIGHT LOSS: 0
COUGH: 0
ABDOMINAL PAIN: 0
TREMORS: 0
WEAKNESS: 0
SPUTUM PRODUCTION: 0
CHILLS: 0
LOSS OF CONSCIOUSNESS: 0
TINGLING: 0
SHORTNESS OF BREATH: 0
PALPITATIONS: 0
ORTHOPNEA: 0
FOCAL WEAKNESS: 0
DOUBLE VISION: 0
FEVER: 0

## 2017-02-10 NOTE — PROGRESS NOTES
"Subjective:   Stephanie Guerrero is a 76 y.o. female who presents today for hospital follow up.  She recently underwent laparoscopic gyn surgery by Dr. Bearden.  During her hospital stay her was noted to have some hypoxia and slightly elevated troponin.  She was evaluated by Dr. Gillis.  No further workup or imagine was required as he felt it was likely demand ischemia from hypoxia.  He recommended ASA and metoprolol.    Today in follow up she sates that she is doing well and isn't sure why she is here for a follow up.  She has no had any chest pain or pressure.  Do dyspnea, pre syncope, syncope, edema.  She has vocal cord inflammation in which her vocal tone is hoarse.  She is on a steroid taper from this and states that vocal tone is improving daily.    She is currently on ASA but not on her Metoprolol.  She verbalizes that she does not want to take any prescription pills and prefers a more natural approach to health and wellness.       Past Medical History   Diagnosis Date   • MEDICAL HOME 4/19/2013   • Impacted ear wax 8/19/2013   • Sinusitis 10/7/2014   • Bronchitis 10/7/2014   • Asthma    • Elevated blood pressure 6/5/2015     Has been monitoring blood pressure at home, readings typically 97/70. 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 BP elevated in office today, she suspects this is secondary to office visit and some recent stress and will continue home monitoring. She limits sodium and is physically active.    • Hiatus hernia syndrome    • Breath shortness      asthma and \"cold weather\"   • Anesthesia      bulbar polio L side of throat-\"no control of liquids on L side, choke easily\"   • Anesthesia      daughter-hx difficulty waking   • Bulbar polio      Affects left side of throat, dysphasia.  (age 16)   • Diabetes (CMS-HCC)      Past Surgical History   Procedure Laterality Date   • Other       urinary bladder prolapse   • Tonsillectomy     • Cataract extraction " with iol       both eyes   • Hysterectomy robotic xi  1/26/2017     Procedure: ROBOTIC XI BSO, BILATERAL URETEROLYSIS ;  Surgeon: Liborio Bearden M.D.;  Location: SURGERY [facility];  Service:    • Anterior repair  1/26/2017     Procedure: ANTERIOR REPAIR FOR ANTERIOR COLPORRHAPHY, POSTERIOR REPAIR;  Surgeon: Liborio Bearden M.D.;  Location: SURGERY [facility];  Service:      Family History   Problem Relation Age of Onset   • Hypertension Mother    • Stroke Mother    • Heart Disease Father    • Hypertension Father    • Diabetes Father    • Hypertension Brother    • Diabetes Brother      History   Smoking status   • Former Smoker -- 0.25 packs/day for 5 years   • Types: Cigarettes   • Quit date: 01/01/1967   Smokeless tobacco   • Never Used     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 42 Tab 0   • predniSONE (DELTASONE) 10 MG Tab 40mg po qday x 3 days then   30 mg po qday x 3 days  20 mp po qday x 3 days   10 mp po qday x 3 days 30 Tab 0   • montelukast (SINGULAIR) 10 MG Tab Take 10 mg by mouth every day.     • Naproxen Sodium (ALEVE) 220 MG Cap Take 1 Cap by mouth 1 time daily as needed.     • Misc Natural Products (ECHINACEA GOLDENSEAL PLUS PO) Take 1 Each by mouth 1 time daily as needed. tincture     • GIAN D ARCO PO Take 1 Each by mouth 3 times a day. Tincture     • multivitamin (THERAGRAN) Tab Take 1 Tab by mouth every day.     • Potassium 99 MG Tab Take 1 Tab by mouth every day.     • Chromium 1 MG Cap Take 1 Cap by mouth every day.     • L-GLUTAMINE PO Take 1 Tab by mouth every day.     • PROAIR  (90 BASE) MCG/ACT Aero Soln inhalation aerosol Inhale 2 Puffs by mouth every 6 hours as needed for Shortness of  "Breath. 1 Inhaler 3   • Cholecalciferol (VITAMIN D-3 PO) Take 4,000 Units by mouth every day.     • Coenzyme Q10 (CO Q 10 PO) Take 1 Tab by mouth every day.     • Probiotic Product (PROBIOTIC PO) Take 1 Tab by mouth every day.     • Omega-3 Fatty Acids (OMEGA 3 PO) Take 1 Tab by mouth every day.     • Loratadine (CLARITIN PO) Take 1 Tab by mouth every day.     • aspirin (ASA) 81 MG Chew Tab chewable tablet Take 1 Tab by mouth every day. 100 Tab    • oxycodone-acetaminophen (PERCOCET) 5-325 MG Tab Take 1 Tab by mouth every four hours as needed. (Patient not taking: Reported on 2/10/2017) 60 Tab 0   • metoclopramide (REGLAN) 10 MG Tab Take 1 Tab by mouth every 6 hours as needed. (Patient not taking: Reported on 2/10/2017) 30 Tab 0   • ondansetron (ZOFRAN) 4 MG Tab tablet Take 1 Tab by mouth every 6 hours as needed. (Patient not taking: Reported on 2/10/2017) 30 Tab 0     No facility-administered encounter medications on file as of 2/10/2017.     Review of Systems   Constitutional: Negative for fever, chills, weight loss and malaise/fatigue.   HENT: Negative for congestion, sore throat and tinnitus.    Eyes: Negative for blurred vision and double vision.   Respiratory: Positive for wheezing. Negative for cough, hemoptysis, sputum production, shortness of breath and stridor.    Cardiovascular: Negative for chest pain, palpitations, orthopnea, leg swelling and PND.   Gastrointestinal: Negative for heartburn, nausea, vomiting, abdominal pain and diarrhea.   Genitourinary: Negative.    Musculoskeletal: Negative.    Skin: Negative for rash.   Neurological: Negative for dizziness, tingling, tremors, sensory change, speech change, focal weakness, loss of consciousness, weakness and headaches.        Objective:   /80 mmHg  Pulse 86  Ht 1.575 m (5' 2.01\")  Wt 52.436 kg (115 lb 9.6 oz)  BMI 21.14 kg/m2  SpO2 94%    Physical Exam   Constitutional: She is oriented to person, place, and time. She appears well-developed " and well-nourished.   HENT:   Head: Normocephalic and atraumatic.   Eyes: Conjunctivae are normal.   Neck: Normal range of motion. Neck supple. No JVD present. No tracheal deviation present.   Cardiovascular: Normal rate, regular rhythm, normal heart sounds and intact distal pulses.  Exam reveals no gallop and no friction rub.    No murmur heard.  Pulmonary/Chest: Effort normal. No stridor. No respiratory distress. She has wheezes (expiratory) in the right lower field and the left lower field. She has no rales. She exhibits no tenderness.   Abdominal: Soft. Bowel sounds are normal. She exhibits no distension. There is no tenderness.   laparoscopic incisions well approximated.  Old bruising noted.    Musculoskeletal: Normal range of motion. She exhibits no edema.   Neurological: She is alert and oriented to person, place, and time.   Skin: Skin is warm and dry. No erythema.   Psychiatric: She has a normal mood and affect. Her behavior is normal. Judgment and thought content normal.       Assessment:     1. Elevated troponin     2. Acute respiratory failure with hypoxia (CMS-[facility])         Medical Decision Making:  Today's Assessment / Status / Plan:   Ms. Guerrero is doing well from a cardiovascular perspective.  She has not had any chest pain or angina symptoms.  She previously stopped her Metoprolol as she does not want to take prescription medication.  She is still on aspirin and agreeable to continuing at this time.  Per Dr. Gillis's consult elevated troponin likely secondary to demand ischemia in the setting of hypoxia vs stress cardiomyopathy       She continue to have vocal dysfunction and wheezing.  She states this is improving daily.  She is on a steroid taper.  She passed her swallow evaluation in the hospital and denies any couching or chocking.  She will see her PCP next week.  She sees an acupuncturist in addition to inhalers for asthma.     RTC in 6 months for review, sooner if needed  Collaborating  MD/ADD: Evan.

## 2017-02-10 NOTE — MR AVS SNAPSHOT
"        Stephanie Guerrero   2/10/2017 2:40 PM   Office Visit   MRN: [id number]    Department:  [facility]   Dept Phone:  [phone]    Description:  Female : 1940   Provider:  LUIS Littlejohn           Reason for Visit     Hospital Follow-up           Allergies as of 2/10/2017     Allergen Noted Reactions    Enalapril 2015   Rash     [id number]      Vital Signs     Blood Pressure Pulse Height Weight Body Mass Index Oxygen Saturation    140/80 mmHg 86 1.575 m (5' 2.01\") 52.436 kg (115 lb 9.6 oz) 21.14 kg/m2 94%    Smoking Status                   Former Smoker           Basic Information     Date Of Birth Sex Race Ethnicity Preferred Language    1940 Female White Non- English      Your appointments     2017  9:20 AM   Established Patient with Alan Singh M.D.   [facility] [address]    [address]  [address]  Formerly [facility] [id number] [phone]           You will be receiving a confirmation call a few days before your appointment from our automated call confirmation system.              Problem List              ICD-10-CM Priority Class Noted - Resolved    Poliomyelitis, bulbar A80.30   Unknown - Present    Vitamin D deficiency E55.9   2015 - Present    Bladder prolapse, female, acquired N81.10   2015 - Present    Moderate persistent asthma without complication J45.40   2015 - Present    Anxiety F41.9   2016 - Present    IGT (impaired glucose tolerance) R73.02   10/14/2016 - Present    Ovarian mass N83.9   11/10/2016 - Present    Unspecified prolapse of vaginal walls N81.10   2017 - Present    Dyspnea R06.00   2017 - Present    Hypokalemia E87.6   2017 - Present    Hyponatremia E87.1   2017 - Present    Elevated troponin R79.89   2017 - Present    Acute respiratory failure with hypoxia (CMS-HCC) J96.01   2017 - Present    Asthma J45.909   2017 - Present    Cystadenoma of right ovary D27.0   " 1/29/2017 - Present    Dysphagia R13.10   1/31/2017 - Present      Health Maintenance        Date Due Completion Dates    IMM DTaP/Tdap/Td Vaccine (1 - Tdap) 6/30/1959 ---    MAMMOGRAM 6/30/1980 ---    IMM INFLUENZA (1) 9/1/2016 ---    BONE DENSITY 5/2/2017 5/2/2012    COLON CANCER SCREENING ANNUAL FIT 10/10/2017 10/10/2016            Current Immunizations     13-VALENT PCV PREVNAR 9/21/2016    Pneumococcal polysaccharide vaccine (PPSV-23) 10/11/2010    SHINGLES VACCINE 4/10/2012      Below and/or attached are the medications your provider expects you to take. Review all of your home medications and newly ordered medications with your provider and/or pharmacist. Follow medication instructions as directed by your provider and/or pharmacist. Please keep your medication list with you and share with your provider. Update the information when medications are discontinued, doses are changed, or new medications (including over-the-counter products) are added; and carry medication information at all times in the event of emergency situations     Allergies:  ENALAPRIL - Rash               Medications  Valid as of: February 10, 2017 -  3:27 PM    Generic Name Brand Name Tablet Size Instructions for use    Albuterol Sulfate (Aero Soln) PROAIR  (90 BASE) MCG/ACT Inhale 2 Puffs by mouth every 6 hours as needed for Shortness of Breath.        Aspirin (Chew Tab) ASA 81 MG Take 1 Tab by mouth every day.        Cholecalciferol   Take 4,000 Units by mouth every day.        Chromium (Cap) Chromium 1 MG Take 1 Cap by mouth every day.        Coenzyme Q10   Take 1 Tab by mouth every day.        Glutamine   Take 1 Tab by mouth every day.        Loratadine   Take 1 Tab by mouth every day.        Metoclopramide HCl (Tab) REGLAN 10 MG Take 1 Tab by mouth every 6 hours as needed.        Misc Natural Products   Take 1 Each by mouth 1 time daily as needed. tincture        Montelukast Sodium (Tab) SINGULAIR 10 MG Take 10 mg by mouth every  day.        Multiple Vitamin (Tab) THERAGRAN  Take 1 Tab by mouth every day.        Naproxen Sodium (Cap) Naproxen Sodium 220 MG Take 1 Cap by mouth 1 time daily as needed.        Omega-3 Fatty Acids   Take 1 Tab by mouth every day.        Ondansetron HCl (Tab) ZOFRAN 4 MG Take 1 Tab by mouth every 6 hours as needed.        Oxycodone-Acetaminophen (Tab) PERCOCET 5-325 MG Take 1 Tab by mouth every four hours as needed.        Leticia D Arco   Take 1 Each by mouth 3 times a day. Tincture        Potassium (Tab) Potassium 99 MG Take 1 Tab by mouth every day.        PredniSONE (Tab) DELTASONE 10 MG 40mg po qday x 3 days then   30 mg po qday x 3 days  20 mp po qday x 3 days   10 mp po qday x 3 days        Probiotic Product   Take 1 Tab by mouth every day.        Sodium Chloride (Tab) SALT 1 GM Take 1 Tab by mouth 3 times a day, with meals for 14 days.        .                 Medicines prescribed today were sent to:     [facility]Y #[id number] - [place] - [address]    [address] [place] [address]    Phone: [phone] Fax: [phone]    Open 24 Hours?: No      Medication refill instructions:       If your prescription bottle indicates you have medication refills left, it is not necessary to call your provider’s office. Please contact your pharmacy and they will refill your medication.    If your prescription bottle indicates you do not have any refills left, you may request refills at any time through one of the following ways: The online [employer] system (except Urgent Care), by calling your provider’s office, or by asking your pharmacy to contact your provider’s office with a refill request. Medication refills are processed only during regular business hours and may not be available until the next business day. Your provider may request additional information or to have a follow-up visit with you prior to refilling your medication.   *Please Note: Medication refills are assigned a new Rx number when refilled  electronically. Your pharmacy may indicate that no refills were authorized even though a new prescription for the same medication is available at the pharmacy. Please request the medicine by name with the pharmacy before contacting your provider for a refill.        Other Notes About Your Plan     Patient is enrolled in [employer] R-MedTeam with Dr. Mally Coles.           MyChart Access Code: Activation code not generated  Current [facility] Status: Active

## 2017-02-14 ENCOUNTER — OFFICE VISIT (OUTPATIENT)
Dept: MEDICAL GROUP | Facility: MEDICAL CENTER | Age: 77
End: 2017-02-14
Payer: MEDICARE

## 2017-02-14 VITALS
TEMPERATURE: 97.3 F | DIASTOLIC BLOOD PRESSURE: 78 MMHG | WEIGHT: 113 LBS | RESPIRATION RATE: 16 BRPM | SYSTOLIC BLOOD PRESSURE: 112 MMHG | BODY MASS INDEX: 20.8 KG/M2 | OXYGEN SATURATION: 92 % | HEART RATE: 82 BPM | HEIGHT: 62 IN

## 2017-02-14 DIAGNOSIS — E87.6 HYPOKALEMIA: ICD-10-CM

## 2017-02-14 DIAGNOSIS — E87.1 HYPONATREMIA: ICD-10-CM

## 2017-02-14 DIAGNOSIS — J38.3 VOCAL CORD DYSFUNCTION: ICD-10-CM

## 2017-02-14 PROBLEM — J96.01 ACUTE RESPIRATORY FAILURE WITH HYPOXIA (HCC): Status: RESOLVED | Noted: 2017-01-29 | Resolved: 2017-02-14

## 2017-02-14 PROBLEM — R79.89 ELEVATED TROPONIN: Status: RESOLVED | Noted: 2017-01-29 | Resolved: 2017-02-14

## 2017-02-14 PROCEDURE — 99214 OFFICE O/P EST MOD 30 MIN: CPT | Performed by: INTERNAL MEDICINE

## 2017-02-14 PROCEDURE — 1036F TOBACCO NON-USER: CPT | Performed by: INTERNAL MEDICINE

## 2017-02-14 PROCEDURE — 1101F PT FALLS ASSESS-DOCD LE1/YR: CPT | Performed by: INTERNAL MEDICINE

## 2017-02-14 PROCEDURE — G8484 FLU IMMUNIZE NO ADMIN: HCPCS | Performed by: INTERNAL MEDICINE

## 2017-02-14 PROCEDURE — 1111F DSCHRG MED/CURRENT MED MERGE: CPT | Performed by: INTERNAL MEDICINE

## 2017-02-14 PROCEDURE — G8419 CALC BMI OUT NRM PARAM NOF/U: HCPCS | Performed by: INTERNAL MEDICINE

## 2017-02-14 PROCEDURE — 4040F PNEUMOC VAC/ADMIN/RCVD: CPT | Performed by: INTERNAL MEDICINE

## 2017-02-14 PROCEDURE — G8432 DEP SCR NOT DOC, RNG: HCPCS | Performed by: INTERNAL MEDICINE

## 2017-02-14 ASSESSMENT — PATIENT HEALTH QUESTIONNAIRE - PHQ9: CLINICAL INTERPRETATION OF PHQ2 SCORE: 0

## 2017-02-14 NOTE — PROGRESS NOTES
CC: Follow-up hospitalization.    HPI:   Stephanie presents today with the following.    1. Hypokalemia  Underwent surgical procedure for ovarian mass which turned out to be benign. While hospitalized she was found to be hyponatremic. She has been on salt tablets but is now back to regular by mouth intake. She is denying any cramping no difficulty breathing or leg swelling.    2. Hyponatremia  Again also hyponatremic on replacement.    3. Vocal cord dysfunction  She does suffer from vocal cord dysfunction would like to see ENT. She did have difficult times with intubation and shortness of breath however she is doing quite well now almost back to baseline.      Patient Active Problem List    Diagnosis Date Noted   • Dysphagia 01/31/2017   • Hypokalemia 01/29/2017   • Hyponatremia 01/29/2017   • Asthma 01/29/2017   • Cystadenoma of right ovary 01/29/2017   • Dyspnea 01/27/2017   • Unspecified prolapse of vaginal walls 01/26/2017   • Ovarian mass 11/10/2016   • IGT (impaired glucose tolerance) 10/14/2016   • Anxiety 09/21/2016   • Moderate persistent asthma without complication 06/05/2015   • Vitamin D deficiency 05/26/2015   • Bladder prolapse, female, acquired 05/26/2015   • Poliomyelitis, bulbar        Current Outpatient Prescriptions   Medication Sig Dispense Refill   • aspirin (ASA) 81 MG Chew Tab chewable tablet Take 1 Tab by mouth every day. 100 Tab    • montelukast (SINGULAIR) 10 MG Tab Take 10 mg by mouth every day.     • Naproxen Sodium (ALEVE) 220 MG Cap Take 1 Cap by mouth 1 time daily as needed.     • Misc Natural Products (ECHINACEA GOLDENSEAL PLUS PO) Take 1 Each by mouth 1 time daily as needed. tincture     • GIAN D ARCO PO Take 1 Each by mouth 3 times a day. Tincture     • metoclopramide (REGLAN) 10 MG Tab Take 1 Tab by mouth every 6 hours as needed. (Patient not taking: Reported on 2/10/2017) 30 Tab 0   • ondansetron (ZOFRAN) 4 MG Tab tablet Take 1 Tab by mouth every 6 hours as needed. (Patient not  "taking: Reported on 2/10/2017) 30 Tab 0   • multivitamin (THERAGRAN) Tab Take 1 Tab by mouth every day.     • Potassium 99 MG Tab Take 1 Tab by mouth every day.     • Chromium 1 MG Cap Take 1 Cap by mouth every day.     • L-GLUTAMINE PO Take 1 Tab by mouth every day.     • PROAIR  (90 BASE) MCG/ACT Aero Soln inhalation aerosol Inhale 2 Puffs by mouth every 6 hours as needed for Shortness of Breath. 1 Inhaler 3   • Cholecalciferol (VITAMIN D-3 PO) Take 4,000 Units by mouth every day.     • Coenzyme Q10 (CO Q 10 PO) Take 1 Tab by mouth every day.     • Probiotic Product (PROBIOTIC PO) Take 1 Tab by mouth every day.     • Omega-3 Fatty Acids (OMEGA 3 PO) Take 1 Tab by mouth every day.     • Loratadine (CLARITIN PO) Take 1 Tab by mouth every day.       No current facility-administered medications for this visit.         Allergies as of 02/14/2017 - Alfred as Reviewed 02/14/2017   Allergen Reaction Noted   • Enalapril Rash 05/26/2015        ROS: As per HPI.    /78 mmHg  Pulse 82  Temp(Src) 36.3 °C (97.3 °F)  Resp 16  Ht 1.575 m (5' 2\")  Wt 51.256 kg (113 lb)  BMI 20.66 kg/m2  SpO2 92%    Physical Exam:  Gen:         Alert and oriented, No apparent distress.  Neck:        No Lymphadenopathy or Bruits.  Lungs:     Clear to auscultation bilaterally  CV:          Regular rate and rhythm. No murmurs, rubs or gallops.               Ext:          No clubbing, cyanosis, edema.      Assessment and Plan.   76 y.o. female with the following issues.    1. Hypokalemia  Recheck blood work  - BASIC METABOLIC PANEL; Future    2. Hyponatremia  Recheck labs.  - BASIC METABOLIC PANEL; Future    3. Vocal cord dysfunction  We have placed referral to ENT for evaluation.  - REFERRAL TO ENT        "

## 2017-02-14 NOTE — MR AVS SNAPSHOT
"        Stephanie Guerrero   2017 9:20 AM   Office Visit   MRN: [id number]    Department:  [address] [place] [address]   Dept Phone:  [phone]    Description:  Female : 1940   Provider:  Alan Singh M.D.           Reason for Visit     Hospital Follow-up           Allergies as of 2017     Allergen Noted Reactions    Enalapril 2015   Rash     ERT=[id number]      You were diagnosed with     Hypokalemia   [106420]       Hyponatremia   [606652]       Vocal cord dysfunction   [272774]         Vital Signs     Blood Pressure Pulse Temperature Respirations Height Weight    112/78 mmHg 82 36.3 °C (97.3 °F) 16 1.575 m (5' 2\") 51.256 kg (113 lb)    Body Mass Index Oxygen Saturation Smoking Status             20.66 kg/m2 92% Former Smoker         Basic Information     Date Of Birth Sex Race Ethnicity Preferred Language    1940 Female White Non- English      Problem List              ICD-10-CM Priority Class Noted - Resolved    Poliomyelitis, bulbar A80.30   Unknown - Present    Vitamin D deficiency E55.9   2015 - Present    Bladder prolapse, female, acquired N81.10   2015 - Present    Moderate persistent asthma without complication J45.40   2015 - Present    Anxiety F41.9   2016 - Present    IGT (impaired glucose tolerance) R73.02   10/14/2016 - Present    Ovarian mass N83.9   11/10/2016 - Present    Unspecified prolapse of vaginal walls N81.10   2017 - Present    Dyspnea R06.00   2017 - Present    Hypokalemia E87.6   2017 - Present    Hyponatremia E87.1   2017 - Present    Asthma J45.909   2017 - Present    Cystadenoma of right ovary D27.0   2017 - Present    Dysphagia R13.10   2017 - Present      Health Maintenance        Date Due Completion Dates    IMM DTaP/Tdap/Td Vaccine (1 - Tdap) 1959 ---    MAMMOGRAM 1980 ---    IMM INFLUENZA (1) 2016 ---    BONE DENSITY 2017    COLON CANCER SCREENING ANNUAL FIT 10/10/2017 " 10/10/2016            Current Immunizations     13-VALENT PCV PREVNAR 9/21/2016    Pneumococcal polysaccharide vaccine (PPSV-23) 10/11/2010    SHINGLES VACCINE 4/10/2012      Below and/or attached are the medications your provider expects you to take. Review all of your home medications and newly ordered medications with your provider and/or pharmacist. Follow medication instructions as directed by your provider and/or pharmacist. Please keep your medication list with you and share with your provider. Update the information when medications are discontinued, doses are changed, or new medications (including over-the-counter products) are added; and carry medication information at all times in the event of emergency situations     Allergies:  ENALAPRIL - Rash               Medications  Valid as of: February 14, 2017 -  9:55 AM    Generic Name Brand Name Tablet Size Instructions for use    Albuterol Sulfate (Aero Soln) PROAIR  (90 BASE) MCG/ACT Inhale 2 Puffs by mouth every 6 hours as needed for Shortness of Breath.        Aspirin (Chew Tab) ASA 81 MG Take 1 Tab by mouth every day.        Cholecalciferol   Take 4,000 Units by mouth every day.        Chromium (Cap) Chromium 1 MG Take 1 Cap by mouth every day.        Coenzyme Q10   Take 1 Tab by mouth every day.        Glutamine   Take 1 Tab by mouth every day.        Loratadine   Take 1 Tab by mouth every day.        Metoclopramide HCl (Tab) REGLAN 10 MG Take 1 Tab by mouth every 6 hours as needed.        Misc Natural Products   Take 1 Each by mouth 1 time daily as needed. tincture        Montelukast Sodium (Tab) SINGULAIR 10 MG Take 10 mg by mouth every day.        Multiple Vitamin (Tab) THERAGRAN  Take 1 Tab by mouth every day.        Naproxen Sodium (Cap) Naproxen Sodium 220 MG Take 1 Cap by mouth 1 time daily as needed.        Omega-3 Fatty Acids   Take 1 Tab by mouth every day.        Ondansetron HCl (Tab) ZOFRAN 4 MG Take 1 Tab by mouth every 6 hours as  needed.        Leticia D Arco   Take 1 Each by mouth 3 times a day. Tincture        Potassium (Tab) Potassium 99 MG Take 1 Tab by mouth every day.        Probiotic Product   Take 1 Tab by mouth every day.        .                 Medicines prescribed today were sent to:     [facility]/PHARMACY #[id number] - [place], [place] - [address]    [address] [place] [address]    Phone: [phone] Fax: [phone]    Open 24 Hours?: No      Medication refill instructions:       If your prescription bottle indicates you have medication refills left, it is not necessary to call your provider’s office. Please contact your pharmacy and they will refill your medication.    If your prescription bottle indicates you do not have any refills left, you may request refills at any time through one of the following ways: The online [employer]) system (except Urgent Care), by calling your provider’s office, or by asking your pharmacy to contact your provider’s office with a refill request. Medication refills are processed only during regular business hours and may not be available until the next business day. Your provider may request additional information or to have a follow-up visit with you prior to refilling your medication.   *Please Note: Medication refills are assigned a new Rx number when refilled electronically. Your pharmacy may indicate that no refills were authorized even though a new prescription for the same medication is available at the pharmacy. Please request the medicine by name with the pharmacy before contacting your provider for a refill.        Your To Do List     Future Labs/Procedures Complete By Expires    BASIC METABOLIC PANEL  As directed 2/15/2018      Referral     A referral request has been sent to our patient care coordination department. Please allow 3-5 business days for us to process this request and contact you either by phone or mail. If you do not hear from us by the 5th business day, please call us at [phone].        Other Notes About Your Plan     Patient is enrolled in [employer] [facility] Mally Coles.           MyChart Access Code: Activation code not generated  Current [facility] Status: Active

## 2017-02-21 ENCOUNTER — HOSPITAL ENCOUNTER (OUTPATIENT)
Dept: LAB | Facility: MEDICAL CENTER | Age: 77
End: 2017-02-21
Attending: INTERNAL MEDICINE
Payer: MEDICARE

## 2017-02-21 DIAGNOSIS — E87.1 HYPONATREMIA: ICD-10-CM

## 2017-02-21 DIAGNOSIS — E87.6 HYPOKALEMIA: ICD-10-CM

## 2017-02-21 LAB
ANION GAP SERPL CALC-SCNC: 7 MMOL/L (ref 0–11.9)
BUN SERPL-MCNC: 19 MG/DL (ref 8–22)
CALCIUM SERPL-MCNC: 9.7 MG/DL (ref 8.5–10.5)
CHLORIDE SERPL-SCNC: 99 MMOL/L (ref 96–112)
CO2 SERPL-SCNC: 31 MMOL/L (ref 20–33)
CREAT SERPL-MCNC: 0.74 MG/DL (ref 0.5–1.4)
GLUCOSE SERPL-MCNC: 77 MG/DL (ref 65–99)
POTASSIUM SERPL-SCNC: 5.2 MMOL/L (ref 3.6–5.5)
SODIUM SERPL-SCNC: 137 MMOL/L (ref 135–145)

## 2017-02-21 PROCEDURE — 36415 COLL VENOUS BLD VENIPUNCTURE: CPT

## 2017-02-21 PROCEDURE — 80048 BASIC METABOLIC PNL TOTAL CA: CPT

## 2017-03-01 ENCOUNTER — PATIENT OUTREACH (OUTPATIENT)
Dept: HEALTH INFORMATION MANAGEMENT | Facility: OTHER | Age: 77
End: 2017-03-01

## 2017-08-30 ENCOUNTER — OFFICE VISIT (OUTPATIENT)
Dept: URGENT CARE | Facility: PHYSICIAN GROUP | Age: 77
End: 2017-08-30
Payer: MEDICARE

## 2017-08-30 ENCOUNTER — HOSPITAL ENCOUNTER (OUTPATIENT)
Facility: MEDICAL CENTER | Age: 77
End: 2017-08-30
Attending: PHYSICIAN ASSISTANT
Payer: MEDICARE

## 2017-08-30 VITALS
HEART RATE: 77 BPM | BODY MASS INDEX: 21.44 KG/M2 | RESPIRATION RATE: 14 BRPM | TEMPERATURE: 99.6 F | WEIGHT: 121 LBS | DIASTOLIC BLOOD PRESSURE: 98 MMHG | HEIGHT: 63 IN | OXYGEN SATURATION: 91 % | SYSTOLIC BLOOD PRESSURE: 142 MMHG

## 2017-08-30 DIAGNOSIS — N30.00 ACUTE CYSTITIS WITHOUT HEMATURIA: ICD-10-CM

## 2017-08-30 DIAGNOSIS — R30.0 DYSURIA: ICD-10-CM

## 2017-08-30 LAB
APPEARANCE UR: CLEAR
BILIRUB UR STRIP-MCNC: NEGATIVE MG/DL
COLOR UR AUTO: YELLOW
GLUCOSE UR STRIP.AUTO-MCNC: NEGATIVE MG/DL
KETONES UR STRIP.AUTO-MCNC: NEGATIVE MG/DL
LEUKOCYTE ESTERASE UR QL STRIP.AUTO: NORMAL
NITRITE UR QL STRIP.AUTO: NEGATIVE
PH UR STRIP.AUTO: 6 [PH] (ref 5–8)
PROT UR QL STRIP: NEGATIVE MG/DL
RBC UR QL AUTO: NEGATIVE
SP GR UR STRIP.AUTO: 1
UROBILINOGEN UR STRIP-MCNC: NEGATIVE MG/DL

## 2017-08-30 PROCEDURE — 87086 URINE CULTURE/COLONY COUNT: CPT

## 2017-08-30 PROCEDURE — 81002 URINALYSIS NONAUTO W/O SCOPE: CPT | Performed by: PHYSICIAN ASSISTANT

## 2017-08-30 PROCEDURE — 99214 OFFICE O/P EST MOD 30 MIN: CPT | Performed by: PHYSICIAN ASSISTANT

## 2017-08-30 RX ORDER — CIPROFLOXACIN 500 MG/1
500 TABLET, FILM COATED ORAL EVERY 12 HOURS
Qty: 10 TAB | Refills: 0 | Status: SHIPPED | OUTPATIENT
Start: 2017-08-30 | End: 2017-09-04

## 2017-08-30 ASSESSMENT — ENCOUNTER SYMPTOMS
FLANK PAIN: 1
FALLS: 0
BACK PAIN: 1
CARDIOVASCULAR NEGATIVE: 1
DIARRHEA: 0
RESPIRATORY NEGATIVE: 1

## 2017-08-30 NOTE — PROGRESS NOTES
Subjective:      Stephanie Guerrero is a 77 y.o. female who presents with Back Pain (lower back pain x 2 weeks ) and Foot Swelling (Left Ankle x 1 day )            Dysuria    This is a new problem. The current episode started 1 to 4 weeks ago. The problem occurs every urination. The problem has been unchanged. The maximum temperature recorded prior to her arrival was 100 - 100.9 F. The fever has been present for less than 1 day. Associated symptoms include flank pain, frequency and urgency. Pertinent negatives include no hematuria. Treatments tried: Vitamins.       PMH:  has a past medical history of Anesthesia; Anesthesia; Asthma; Breath shortness; Bronchitis (10/7/2014); Bulbar polio; Diabetes (CMS-[facility]); Elevated blood pressure (6/5/2015); Hiatus hernia syndrome; Impacted ear wax (8/19/2013); MEDICAL HOME (4/19/2013); and Sinusitis (10/7/2014).  MEDS:   Current Outpatient Prescriptions:   •  metoclopramide (REGLAN) 10 MG Tab, Take 1 Tab by mouth every 6 hours as needed., Disp: 30 Tab, Rfl: 0  •  aspirin (ASA) 81 MG Chew Tab chewable tablet, Take 1 Tab by mouth every day., Disp: 100 Tab, Rfl:   •  montelukast (SINGULAIR) 10 MG Tab, Take 10 mg by mouth every day., Disp: , Rfl:   •  Naproxen Sodium (ALEVE) 220 MG Cap, Take 1 Cap by mouth 1 time daily as needed., Disp: , Rfl:   •  Misc Natural Products (ECHINACEA GOLDENSEAL PLUS PO), Take 1 Each by mouth 1 time daily as needed. tincture, Disp: , Rfl:   •  GIAN D ARCO PO, Take 1 Each by mouth 3 times a day. Tincture, Disp: , Rfl:   •  ondansetron (ZOFRAN) 4 MG Tab tablet, Take 1 Tab by mouth every 6 hours as needed. (Patient not taking: Reported on 2/10/2017), Disp: 30 Tab, Rfl: 0  •  multivitamin (THERAGRAN) Tab, Take 1 Tab by mouth every day., Disp: , Rfl:   •  Potassium 99 MG Tab, Take 1 Tab by mouth every day., Disp: , Rfl:   •  Chromium 1 MG Cap, Take 1 Cap by mouth every day., Disp: , Rfl:   •  L-GLUTAMINE PO, Take 1 Tab by mouth every day., Disp: , Rfl:   •   PROAIR  (90 BASE) MCG/ACT Aero Soln inhalation aerosol, Inhale 2 Puffs by mouth every 6 hours as needed for Shortness of Breath., Disp: 1 Inhaler, Rfl: 3  •  Cholecalciferol (VITAMIN D-3 PO), Take 4,000 Units by mouth every day., Disp: , Rfl:   •  Coenzyme Q10 (CO Q 10 PO), Take 1 Tab by mouth every day., Disp: , Rfl:   •  Probiotic Product (PROBIOTIC PO), Take 1 Tab by mouth every day., Disp: , Rfl:   •  Omega-3 Fatty Acids (OMEGA 3 PO), Take 1 Tab by mouth every day., Disp: , Rfl:   •  Loratadine (CLARITIN PO), Take 1 Tab by mouth every day., Disp: , Rfl:   ALLERGIES:   Allergies   Allergen Reactions   • Enalapril Rash      [id number]     SURGHX:   Past Surgical History:   Procedure Laterality Date   • HYSTERECTOMY ROBOTIC XI  1/26/2017    Procedure: ROBOTIC XI BSO, BILATERAL URETEROLYSIS ;  Surgeon: Liborio Bearden M.D.;  Location: SURGERY [facility];  Service:    • ANTERIOR REPAIR  1/26/2017    Procedure: ANTERIOR REPAIR FOR ANTERIOR COLPORRHAPHY, POSTERIOR REPAIR;  Surgeon: Liborio Bearden M.D.;  Location: SURGERY [facility];  Service:    • CATARACT EXTRACTION WITH IOL      both eyes   • OTHER      urinary bladder prolapse   • TONSILLECTOMY       SOCHX:  reports that she quit smoking about 50 years ago. Her smoking use included Cigarettes. She has a 1.25 pack-year smoking history. She has never used smokeless tobacco. She reports that she drinks alcohol. She reports that she does not use drugs.  FH: family history includes Diabetes in her brother and father; Heart Disease in her father; Hypertension in her brother, father, and mother; Stroke in her mother.    Review of Systems   Respiratory: Negative.    Cardiovascular: Negative.    Gastrointestinal: Negative for diarrhea.   Genitourinary: Positive for dysuria, flank pain, frequency and urgency. Negative for hematuria.   Musculoskeletal: Positive for back pain. Negative for falls.       Medications, Allergies, and current problem list reviewed today in  "Epic     Objective:     /98   Pulse 77   Temp 37.6 °C (99.6 °F)   Resp 14   Ht 1.6 m (5' 3\")   Wt 54.9 kg (121 lb)   SpO2 91%   BMI 21.43 kg/m²      Physical Exam   Constitutional: She is oriented to person, place, and time. She appears well-developed and well-nourished. No distress.   HENT:   Head: Normocephalic and atraumatic.   Right Ear: External ear normal.   Left Ear: External ear normal.   Eyes: Conjunctivae and EOM are normal.   Neck: Normal range of motion. Neck supple.   Cardiovascular: Normal rate, regular rhythm and normal heart sounds.    Pulmonary/Chest: Effort normal and breath sounds normal. No respiratory distress. She has no wheezes.   Abdominal: Soft. She exhibits no distension. There is no tenderness.   Musculoskeletal:   Right-sided flank pain   Neurological: She is alert and oriented to person, place, and time.   Skin: Skin is warm and dry. She is not diaphoretic.   Psychiatric: She has a normal mood and affect. Her behavior is normal. Judgment and thought content normal.   Nursing note and vitals reviewed.         Urinalysis: Positive leukocytes.     Assessment/Plan:     1. Acute cystitis without hematuria  Urine Culture    ciprofloxacin (CIPRO) 500 MG Tab   2. Dysuria  POCT Urinalysis     Possible early pyelonephritis. Low-grade fever, right flank pain, urinary symptoms. Therefore Cipro was chosen.  Take full course of antibiotic  Urine culture, I will only call patient if I need to change antibiotic pending results  Significantly increase fluids  OTC Motrin or Azo as needed  Cranberry as tolerated  Return to clinic or go to ED if symptoms worsen or persist. Indications for ED discussed at length. Patient voices understanding. Follow-up with your primary care provider in 3-5 days. Red flags discussed.    Please note that this dictation was created using voice recognition software. I have made every reasonable attempt to correct obvious errors, but I expect that there are errors " of grammar and possibly content that I did not discover before finalizing the note.

## 2017-08-31 ENCOUNTER — TELEPHONE (OUTPATIENT)
Dept: URGENT CARE | Facility: PHYSICIAN GROUP | Age: 77
End: 2017-08-31

## 2017-08-31 DIAGNOSIS — N30.00 ACUTE CYSTITIS WITHOUT HEMATURIA: ICD-10-CM

## 2017-08-31 RX ORDER — CIPROFLOXACIN 250 MG/1
250 TABLET, FILM COATED ORAL 2 TIMES DAILY
Qty: 10 TAB | Refills: 0 | Status: SHIPPED | OUTPATIENT
Start: 2017-08-31 | End: 2017-09-05

## 2017-08-31 NOTE — TELEPHONE ENCOUNTER
Ms. Guerrero called stating she had been prescribed 500mg Cipro yesterday for a UTI.  After reading the the pamphlet that comes with the medication, she is worried that the dosage may be too high for her age.  She would like a lower dose if possible.

## 2017-09-02 LAB
BACTERIA UR CULT: NORMAL
SIGNIFICANT IND 70042: NORMAL
SOURCE SOURCE: NORMAL

## 2018-01-24 ENCOUNTER — OFFICE VISIT (OUTPATIENT)
Dept: URGENT CARE | Facility: PHYSICIAN GROUP | Age: 78
End: 2018-01-24
Payer: MEDICARE

## 2018-01-24 ENCOUNTER — HOSPITAL ENCOUNTER (OUTPATIENT)
Facility: MEDICAL CENTER | Age: 78
End: 2018-01-24
Attending: FAMILY MEDICINE
Payer: MEDICARE

## 2018-01-24 VITALS
TEMPERATURE: 98.4 F | BODY MASS INDEX: 21.44 KG/M2 | SYSTOLIC BLOOD PRESSURE: 138 MMHG | WEIGHT: 121 LBS | HEART RATE: 62 BPM | HEIGHT: 63 IN | OXYGEN SATURATION: 89 % | DIASTOLIC BLOOD PRESSURE: 94 MMHG

## 2018-01-24 DIAGNOSIS — N30.01 ACUTE CYSTITIS WITH HEMATURIA: ICD-10-CM

## 2018-01-24 LAB
APPEARANCE UR: CLEAR
BILIRUB UR STRIP-MCNC: NORMAL MG/DL
COLOR UR AUTO: YELLOW
GLUCOSE UR STRIP.AUTO-MCNC: NORMAL MG/DL
KETONES UR STRIP.AUTO-MCNC: NORMAL MG/DL
LEUKOCYTE ESTERASE UR QL STRIP.AUTO: NORMAL
NITRITE UR QL STRIP.AUTO: NORMAL
PH UR STRIP.AUTO: 7 [PH] (ref 5–8)
PROT UR QL STRIP: NORMAL MG/DL
RBC UR QL AUTO: NORMAL
SP GR UR STRIP.AUTO: 1.01
UROBILINOGEN UR STRIP-MCNC: NORMAL MG/DL

## 2018-01-24 PROCEDURE — 81002 URINALYSIS NONAUTO W/O SCOPE: CPT | Performed by: FAMILY MEDICINE

## 2018-01-24 PROCEDURE — 99214 OFFICE O/P EST MOD 30 MIN: CPT | Performed by: FAMILY MEDICINE

## 2018-01-24 PROCEDURE — 87086 URINE CULTURE/COLONY COUNT: CPT

## 2018-01-24 RX ORDER — CEFDINIR 300 MG/1
300 CAPSULE ORAL EVERY 12 HOURS
Qty: 14 CAP | Refills: 0 | Status: SHIPPED | OUTPATIENT
Start: 2018-01-24 | End: 2018-01-31

## 2018-01-24 ASSESSMENT — PAIN SCALES - GENERAL: PAINLEVEL: NO PAIN

## 2018-01-24 NOTE — PATIENT INSTRUCTIONS
Urinary Tract Infection  A urinary tract infection (UTI) can occur any place along the urinary tract. The tract includes the kidneys, ureters, bladder, and urethra. A type of germ called bacteria often causes a UTI. UTIs are often helped with antibiotic medicine.   HOME CARE   · If given, take antibiotics as told by your doctor. Finish them even if you start to feel better.  · Drink enough fluids to keep your pee (urine) clear or pale yellow.  · Avoid tea, drinks with caffeine, and bubbly (carbonated) drinks.  · Pee often. Avoid holding your pee in for a long time.  · Pee before and after having sex (intercourse).  · Wipe from front to back after you poop (bowel movement) if you are a woman. Use each tissue only once.  GET HELP RIGHT AWAY IF:   · You have back pain.  · You have lower belly (abdominal) pain.  · You have chills.  · You feel sick to your stomach (nauseous).  · You throw up (vomit).  · Your burning or discomfort with peeing does not go away.  · You have a fever.  · Your symptoms are not better in 3 days.  MAKE SURE YOU:   · Understand these instructions.  · Will watch your condition.  · Will get help right away if you are not doing well or get worse.     This information is not intended to replace advice given to you by your health care provider. Make sure you discuss any questions you have with your health care provider.     Document Released: 06/05/2009 Document Revised: 01/08/2016 Document Reviewed: 07/18/2013  [employer] Interactive Patient Education ©2016 [employer]

## 2018-01-26 LAB
BACTERIA UR CULT: NORMAL
SIGNIFICANT IND 70042: NORMAL
SITE SITE: NORMAL
SOURCE SOURCE: NORMAL

## 2018-01-26 ASSESSMENT — ENCOUNTER SYMPTOMS
NUMBNESS: 0
WEAKNESS: 0
BACK PAIN: 1
PERIANAL NUMBNESS: 0
TINGLING: 0
BOWEL INCONTINENCE: 0

## 2018-01-27 NOTE — PROGRESS NOTES
"Subjective:   Stephanie Guerrero is a 77 y.o. female who presents for Back Pain (Lower back pain . x2 weeks Pt hx of UTIS)        Back Pain   This is a new problem. The current episode started 1 to 4 weeks ago. The problem occurs constantly. The problem has been gradually worsening since onset. The pain is present in the thoracic spine. The quality of the pain is described as aching and burning. The pain is moderate. Pertinent negatives include no bladder incontinence, bowel incontinence, numbness, perianal numbness, tingling or weakness.     Review of Systems   Gastrointestinal: Negative for bowel incontinence.   Genitourinary: Negative for bladder incontinence.   Musculoskeletal: Positive for back pain.   Neurological: Negative for tingling, weakness and numbness.     Allergies   Allergen Reactions   • Enalapril Rash      QFL=[id number]      Objective:   /94   Pulse 62   Temp 36.9 °C (98.4 °F)   Ht 1.6 m (5' 3\")   Wt 54.9 kg (121 lb)   SpO2 89%   Breastfeeding? No   BMI 21.43 kg/m²   Physical Exam   Constitutional: She is oriented to person, place, and time. She appears well-developed and well-nourished. No distress.   HENT:   Head: Normocephalic and atraumatic.   Eyes: Conjunctivae and EOM are normal. Pupils are equal, round, and reactive to light.   Cardiovascular: Normal rate, regular rhythm, normal heart sounds and intact distal pulses.    No murmur heard.  Pulmonary/Chest: Effort normal and breath sounds normal. No respiratory distress.   Abdominal: Soft. Bowel sounds are normal. She exhibits no distension. There is no tenderness. There is no CVA tenderness.   Musculoskeletal: Normal range of motion.   Neurological: She is alert and oriented to person, place, and time. She has normal reflexes. No sensory deficit.   Skin: Skin is warm and dry.   Psychiatric: She has a normal mood and affect. Her behavior is normal.   Vitals reviewed.        Assessment/Plan:   Assessment    1. Acute cystitis with " hematuria  - POCT Urinalysis  - Urine Culture; Future  - cefdinir (OMNICEF) 300 MG Cap; Take 1 Cap by mouth every 12 hours for 7 days.  Dispense: 14 Cap; Refill: 0  Differential diagnosis, natural history, supportive care, and indications for immediate follow-up discussed.

## 2018-03-14 ENCOUNTER — HOSPITAL ENCOUNTER (OUTPATIENT)
Dept: LAB | Facility: MEDICAL CENTER | Age: 78
End: 2018-03-14
Attending: PHYSICIAN ASSISTANT
Payer: MEDICARE

## 2018-03-14 ENCOUNTER — OFFICE VISIT (OUTPATIENT)
Dept: URGENT CARE | Facility: PHYSICIAN GROUP | Age: 78
End: 2018-03-14
Payer: MEDICARE

## 2018-03-14 VITALS
WEIGHT: 122 LBS | BODY MASS INDEX: 21.62 KG/M2 | SYSTOLIC BLOOD PRESSURE: 130 MMHG | HEIGHT: 63 IN | DIASTOLIC BLOOD PRESSURE: 88 MMHG | RESPIRATION RATE: 20 BRPM | OXYGEN SATURATION: 93 % | HEART RATE: 68 BPM | TEMPERATURE: 98.2 F

## 2018-03-14 DIAGNOSIS — R00.2 HEART PALPITATIONS: ICD-10-CM

## 2018-03-14 LAB
ANION GAP SERPL CALC-SCNC: 7 MMOL/L (ref 0–11.9)
BUN SERPL-MCNC: 12 MG/DL (ref 8–22)
CALCIUM SERPL-MCNC: 10.4 MG/DL (ref 8.5–10.5)
CHLORIDE SERPL-SCNC: 97 MMOL/L (ref 96–112)
CO2 SERPL-SCNC: 30 MMOL/L (ref 20–33)
CREAT SERPL-MCNC: 0.69 MG/DL (ref 0.5–1.4)
GLUCOSE SERPL-MCNC: 97 MG/DL (ref 65–99)
POTASSIUM SERPL-SCNC: 4.7 MMOL/L (ref 3.6–5.5)
SODIUM SERPL-SCNC: 134 MMOL/L (ref 135–145)

## 2018-03-14 PROCEDURE — 36415 COLL VENOUS BLD VENIPUNCTURE: CPT

## 2018-03-14 PROCEDURE — 99214 OFFICE O/P EST MOD 30 MIN: CPT | Performed by: PHYSICIAN ASSISTANT

## 2018-03-14 PROCEDURE — 80048 BASIC METABOLIC PNL TOTAL CA: CPT

## 2018-03-14 ASSESSMENT — PAIN SCALES - GENERAL: PAINLEVEL: NO PAIN

## 2018-03-14 NOTE — PROGRESS NOTES
"Chief Complaint   Patient presents with   • Blood Pressure Problem     x2days poss palpitations this AM       HISTORY OF PRESENT ILLNESS: Patient is a 77 y.o. female who presents today for 2 days of higher blood pressure readings and possible palpitations. Patient does not have hx of known heart disease but notes she has had 1-2 episodes of  \"fluttering sensation in my chest\" She denies chest pain or pressure.  Denies headaches, blurred vision.  No leg swelling, difficulty laying flat.   Patient is currently not taking anything for blood pressure other than a homeopathic supplement. Patient notes that this does seem to work as a treatment when she has intermittent higher readings.  Highest reading was two days ago and was 160/100 after an initial high fo 158/90 that scared her.   Hx of hyponatremia and hypokalemia per her chart however was secondary to acute illness in 2017/inpatient stay.     Patient Active Problem List    Diagnosis Date Noted   • Dysphagia 01/31/2017   • Hypokalemia 01/29/2017   • Hyponatremia 01/29/2017   • Asthma 01/29/2017   • Cystadenoma of right ovary 01/29/2017   • Dyspnea 01/27/2017   • Unspecified prolapse of vaginal walls 01/26/2017   • Ovarian mass 11/10/2016   • IGT (impaired glucose tolerance) 10/14/2016   • Anxiety 09/21/2016   • Moderate persistent asthma without complication 06/05/2015   • Vitamin D deficiency 05/26/2015   • Bladder prolapse, female, acquired 05/26/2015   • Poliomyelitis, bulbar        Allergies:Enalapril    Current Outpatient Prescriptions Ordered in [facility]   Medication Sig Dispense Refill   • aspirin (ASA) 81 MG Chew Tab chewable tablet Take 1 Tab by mouth every day. 100 Tab    • montelukast (SINGULAIR) 10 MG Tab Take 10 mg by mouth every day.     • Naproxen Sodium (ALEVE) 220 MG Cap Take 1 Cap by mouth 1 time daily as needed.     • Misc Natural Products (ECHINACEA GOLDENSEAL PLUS PO) Take 1 Each by mouth 1 time daily as needed. tincture     • GIAN D ARCO PO Take 1 " "Each by mouth 3 times a day. Tincture     • metoclopramide (REGLAN) 10 MG Tab Take 1 Tab by mouth every 6 hours as needed. 30 Tab 0   • multivitamin (THERAGRAN) Tab Take 1 Tab by mouth every day.     • Potassium 99 MG Tab Take 1 Tab by mouth every day.     • Chromium 1 MG Cap Take 1 Cap by mouth every day.     • L-GLUTAMINE PO Take 1 Tab by mouth every day.     • PROAIR  (90 BASE) MCG/ACT Aero Soln inhalation aerosol Inhale 2 Puffs by mouth every 6 hours as needed for Shortness of Breath. 1 Inhaler 3   • Cholecalciferol (VITAMIN D-3 PO) Take 4,000 Units by mouth every day.     • Coenzyme Q10 (CO Q 10 PO) Take 1 Tab by mouth every day.     • Probiotic Product (PROBIOTIC PO) Take 1 Tab by mouth every day.     • Omega-3 Fatty Acids (OMEGA 3 PO) Take 1 Tab by mouth every day.     • Loratadine (CLARITIN PO) Take 1 Tab by mouth every day.       No current [facility]-ordered facility-administered medications on file.        Past Medical History:   Diagnosis Date   • Anesthesia     bulbar polio L side of throat-\"no control of liquids on L side, choke easily\"   • Anesthesia     daughter-hx difficulty waking   • Asthma    • Breath shortness     asthma and \"cold weather\"   • Bronchitis 10/7/2014   • Bulbar polio     Affects left side of throat, dysphasia.  (age 16)   • Diabetes (CMS-[facility])    • Elevated blood pressure 6/5/2015    Has been monitoring blood pressure at home, readings typically 97/70. 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 BP elevated in office today, she suspects this is secondary to office visit and some recent stress and will continue home monitoring. She limits sodium and is physically active.    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    intracerebral hemorrhage   • Father  at age 67 y.o.    heart disease   • Brother Alive     Family History   Problem Relation Age of Onset   • Hypertension Mother    • Stroke Mother    • Heart Disease Father    • Hypertension Father    • Diabetes Father    • Hypertension Brother    • Diabetes Brother        ROS:  Review of Systems   Constitutional: Negative for fever, chills, weight loss and malaise/fatigue.   HENT: Negative for ear pain, nosebleeds, congestion, sore throat and neck pain.    Eyes: Negative for blurred vision.   Respiratory: Negative for cough, sputum production, shortness of breath and wheezing.    Cardiovascular: SEE HPI   Gastrointestinal: Negative for heartburn, nausea, vomiting and abdominal pain.   All other systems reviewed and are negative.       Exam:  Blood pressure 130/88, pulse 68, temperature 36.8 °C (98.2 °F), resp. rate 20, height 1.6 m (5' 3\"), weight 55.3 kg (122 lb), SpO2 93 %.  General:  Well nourished, well developed female in NAD  Eyes: PERRLA, EOM within normal limits, no conjunctival injection, no scleral icterus, visual fields and acuity grossly intact.  Mouth: reasonable hygiene, no erythema exudates or tonsillar enlargement.  Neck: no masses, range of motion within normal limits, no tracheal deviation. No lymphadenopathy  Pulmonary: Normal respiratory effort, no wheezes, crackles, or rhonchi.  Cardiovascular: regular rate and rhythm without murmurs, rubs, or gallops.  Abdomen: Soft, nontender, nondistended. Normal bowel sounds. No hepatosplenomegaly or masses, or hernias. No rebound or guarding.  Skin: No visible rashes or lesion. Warm, pink, dry.   Extremities: no clubbing, cyanosis, or edema.  Neuro: A&O x 3. Speech " normal/clear.  Normal gait.       EKG Interpretation   Interpreted by me   Rhythm: normal sinus   Rate: normal   Axis: poss left axis  Ectopy: none   Conduction: normal   ST Segments: no acute change   T Waves: no acute change   Q Waves: none   Clinical Impression: no acute changes and normal EKG      Component Results     Component Value Ref Range & Units Status   Sodium 134   135 - 145 mmol/L Final   Potassium 4.7  3.6 - 5.5 mmol/L Final   Chloride 97  96 - 112 mmol/L Final   Co2 30  20 - 33 mmol/L Final   Glucose 97  65 - 99 mg/dL Final   Bun 12  8 - 22 mg/dL Final   Creatinine 0.69  0.50 - 1.40 mg/dL Final   Calcium 10.4  8.5 - 10.5 mg/dL Final   Anion Gap 7.0  0.0 - 11.9 Final       Assessment/Plan:  1. Heart palpitations  EKG    BASIC METABOLIC PANEL    REFERRAL TO CARDIOLOGY       -EKG in clinic, vitals and exam benign  -BMP benign overall, minimal hyponatremia.   -referral to Cardiology placed for follow up  -patient is due for check up/annual with Dr. Singh her PCP  -patient given several strict ER precautions regarding her symptoms, recurrence of palpitations or prolonged palpitations, chest pain, new SOB, dizziness.   -monitor BP 4-5 times a week or feeling unwell however recommend against several times a day checking.   -25 mins spent in face to face time with patient.        Supportive care, differential diagnoses, and indications for immediate follow-up discussed with patient.   Pathogenesis of diagnosis discussed including typical length and natural progression.   Instructed to return to clinic or nearest emergency department for any change in condition, further concerns, or worsening of symptoms.  Patient states understanding of the plan of care and discharge instructions.  Instructed to make an appointment, for follow up, with their primary care provider.      Jessica Choi P.A.-C.

## 2018-03-21 ENCOUNTER — OFFICE VISIT (OUTPATIENT)
Dept: CARDIOLOGY | Facility: MEDICAL CENTER | Age: 78
End: 2018-03-21
Payer: MEDICARE

## 2018-03-21 VITALS
SYSTOLIC BLOOD PRESSURE: 160 MMHG | OXYGEN SATURATION: 96 % | HEART RATE: 64 BPM | HEIGHT: 63 IN | BODY MASS INDEX: 21.97 KG/M2 | DIASTOLIC BLOOD PRESSURE: 80 MMHG | WEIGHT: 124 LBS | RESPIRATION RATE: 12 BRPM

## 2018-03-21 DIAGNOSIS — I10 WHITE COAT SYNDROME WITH DIAGNOSIS OF HYPERTENSION: Chronic | ICD-10-CM

## 2018-03-21 DIAGNOSIS — R00.2 PALPITATIONS: ICD-10-CM

## 2018-03-21 DIAGNOSIS — I10 ESSENTIAL HYPERTENSION: Chronic | ICD-10-CM

## 2018-03-21 PROCEDURE — 99214 OFFICE O/P EST MOD 30 MIN: CPT | Performed by: INTERNAL MEDICINE

## 2018-03-21 ASSESSMENT — ENCOUNTER SYMPTOMS
FALLS: 0
COUGH: 0
PND: 0
BRUISES/BLEEDS EASILY: 0
NAUSEA: 0
SHORTNESS OF BREATH: 0
DIZZINESS: 0
HEARTBURN: 1
SORE THROAT: 0
FOCAL WEAKNESS: 0
ABDOMINAL PAIN: 0
BLURRED VISION: 0
WEAKNESS: 0
CLAUDICATION: 0
FEVER: 0
PALPITATIONS: 1
CHILLS: 0

## 2018-03-21 NOTE — PROGRESS NOTES
"Chief Complaint   Patient presents with   • Palpitations     Had palpitations, blood pressure was elevated. EKG was performed at urgent care and labs drawn. Sodium level was low.       Subjective:   Stephanie Guerrero is a 77 y.o. female who presents today for follow-up of her history of mildly positive troponin last in the setting of GYN surgery    She's been doing well she was describing some palpitations with a gurgling sensation in the chest and went to urgent care where she was found acceptable blood pressure which is atypical for her home blood pressure readings are at goal averages around 120 and she often has elevated blood pressure readings in the office visits like today    Past Medical History:   Diagnosis Date   • Anesthesia     bulbar polio L side of throat-\"no control of liquids on L side, choke easily\"   • Anesthesia     daughter-hx difficulty waking   • Asthma    • Breath shortness     asthma and \"cold weather\"   • Bronchitis 10/7/2014   • Bulbar polio     Affects left side of throat, dysphasia.  (age 16)   • Diabetes (CMS-HCC)    • Elevated blood pressure 6/5/2015    Has been monitoring blood pressure at home, readings typically 97/70. 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 BP elevated in office today, she suspects this is secondary to office visit and some recent stress and will continue home monitoring. She limits sodium and is physically active.    • Essential hypertension    • Hiatus hernia syndrome    • Impacted ear wax 8/19/2013   • MEDICAL HOME 4/19/2013   • Sinusitis 10/7/2014   • White coat syndrome with diagnosis of hypertension      Past Surgical History:   Procedure Laterality Date   • HYSTERECTOMY ROBOTIC XI  1/26/2017    Procedure: ROBOTIC XI BSO, BILATERAL URETEROLYSIS ;  Surgeon: Liborio Bearden M.D.;  Location: SURGERY [facility];  Service:    • ANTERIOR REPAIR  1/26/2017    Procedure: ANTERIOR REPAIR FOR ANTERIOR COLPORRHAPHY, " POSTERIOR REPAIR;  Surgeon: Liborio Bearden M.D.;  Location: SURGERY [facility];  Service:    • CATARACT EXTRACTION WITH IOL      both eyes   • OTHER      urinary bladder prolapse   • TONSILLECTOMY       Family History   Problem Relation Age of Onset   • Hypertension Mother    • Stroke Mother    • Heart Disease Father    • Hypertension Father    • Diabetes Father    • Hypertension Brother    • Diabetes Brother      Social History     Social History   • Marital status:      Spouse name: N/A   • Number of children: 2   • Years of education: N/A     Occupational History   • retired- self employed Retired     Social History Main Topics   • Smoking status: Former Smoker     Packs/day: 0.25     Years: 5.00     Types: Cigarettes     Quit date: 1/1/1967   • Smokeless tobacco: Never Used      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     • montelukast (SINGULAIR) 10 MG Tab Take 10 mg by mouth every day.     • multivitamin (THERAGRAN) Tab Take 1 Tab by mouth every day.     • Chromium 1 MG Cap Take 1 Cap by mouth every day.     • L-GLUTAMINE PO Take 1 Tab by mouth every day.     • PROAIR  (90 BASE) MCG/ACT Aero Soln inhalation aerosol Inhale 2 Puffs by mouth every 6 hours as needed for Shortness of Breath. 1 Inhaler 3   • Cholecalciferol (VITAMIN D-3 PO) Take 4,000 Units by mouth every day.     • Coenzyme Q10 (CO Q 10 PO) Take 1 Tab by mouth every day.     • Probiotic  "Product (PROBIOTIC PO) Take 1 Tab by mouth every day.     • Omega-3 Fatty Acids (OMEGA 3 PO) Take 1 Tab by mouth every day.     • Loratadine (CLARITIN PO) Take 1 Tab by mouth every day.     • aspirin (ASA) 81 MG Chew Tab chewable tablet Take 1 Tab by mouth every day. 100 Tab    • Naproxen Sodium (ALEVE) 220 MG Cap Take 1 Cap by mouth 1 time daily as needed.     • Misc Natural Products (ECHINACEA GOLDENSEAL PLUS PO) Take 1 Each by mouth 1 time daily as needed. tincture     • GIAN D ARCO PO Take 1 Each by mouth 3 times a day. Tincture     • metoclopramide (REGLAN) 10 MG Tab Take 1 Tab by mouth every 6 hours as needed. (Patient not taking: Reported on 3/21/2018) 30 Tab 0   • Potassium 99 MG Tab Take 1 Tab by mouth every day.       No facility-administered encounter medications on file as of 3/21/2018.      Review of Systems   Constitutional: Negative for chills and fever.   HENT: Negative for sore throat.    Eyes: Negative for blurred vision.   Respiratory: Negative for cough and shortness of breath.    Cardiovascular: Positive for palpitations. Negative for chest pain, claudication, leg swelling and PND.   Gastrointestinal: Positive for heartburn. Negative for abdominal pain and nausea.   Musculoskeletal: Negative for falls and joint pain.   Skin: Negative for rash.   Neurological: Negative for dizziness, focal weakness and weakness.   Endo/Heme/Allergies: Does not bruise/bleed easily.        Objective:   /80   Pulse 64   Resp 12   Ht 1.6 m (5' 3\")   Wt 56.2 kg (124 lb)   SpO2 96%   BMI 21.97 kg/m²     Physical Exam   Constitutional: No distress.   HENT:   Mouth/Throat: Oropharynx is clear and moist. No oropharyngeal exudate.   Eyes: No scleral icterus.   Neck: No JVD present.   Cardiovascular: Normal rate and normal heart sounds.  Exam reveals no gallop and no friction rub.    No murmur heard.  Pulmonary/Chest: No respiratory distress. She has no wheezes. She has no rales.   She has a wheezing from her " vocal cord dysfunction   Abdominal: Soft. Bowel sounds are normal.   Musculoskeletal: She exhibits no edema.   Neurological: She is alert.   Skin: No rash noted. She is not diaphoretic.   Psychiatric: She has a normal mood and affect.     EKG last week is normal    Labs are within acceptable range mildly low sodium at 134    Assessment:     1. Essential hypertension     2. White coat syndrome with diagnosis of hypertension     3. Palpitations         Medical Decision Making:  Today's Assessment / Status / Plan:     It was my pleasure to meet with Ms. Guerrero.    Overall palpitations don't seem alarming I did offer that we could do a Holter test definitive diagnosis but I don't think she absolutely needs this overall    Ms. Guerrero does not require regular cardiology follow up, I have advised her to call our office or e-mail using my [facility] if needed.    It is my pleasure to participate in the care of Ms. Guerrero.  Please do not hesitate to contact me with questions or concerns.    Iam Gillis MD PhD FAC  Cardiologist [facility]

## 2018-05-18 ENCOUNTER — PATIENT OUTREACH (OUTPATIENT)
Dept: HEALTH INFORMATION MANAGEMENT | Facility: OTHER | Age: 78
End: 2018-05-18

## 2018-05-18 NOTE — PROGRESS NOTES
Pt did not want to verify  or name, she stated that she did not want to answer any questions and hung up.

## 2018-06-09 ENCOUNTER — HOSPITAL ENCOUNTER (EMERGENCY)
Facility: MEDICAL CENTER | Age: 78
End: 2018-06-09
Attending: EMERGENCY MEDICINE
Payer: MEDICARE

## 2018-06-09 VITALS
OXYGEN SATURATION: 91 % | WEIGHT: 121.47 LBS | TEMPERATURE: 98.7 F | HEIGHT: 61 IN | HEART RATE: 61 BPM | BODY MASS INDEX: 22.93 KG/M2

## 2018-06-09 DIAGNOSIS — I10 HYPERTENSION, UNSPECIFIED TYPE: ICD-10-CM

## 2018-06-09 DIAGNOSIS — R04.0 EPISTAXIS: ICD-10-CM

## 2018-06-09 DIAGNOSIS — F41.9 ANXIETY: ICD-10-CM

## 2018-06-09 PROCEDURE — 99284 EMERGENCY DEPT VISIT MOD MDM: CPT

## 2018-06-09 PROCEDURE — 700101 HCHG RX REV CODE 250

## 2018-06-09 PROCEDURE — 303620 HCHG EPISTAXIS CONTROL

## 2018-06-09 RX ORDER — EPINEPHRINE 1 MG/ML
1 INJECTION INTRAMUSCULAR; INTRAVENOUS; SUBCUTANEOUS ONCE
Status: COMPLETED | OUTPATIENT
Start: 2018-06-09 | End: 2018-06-09

## 2018-06-09 RX ORDER — ALPRAZOLAM 0.25 MG/1
0.25 TABLET ORAL 3 TIMES DAILY PRN
Qty: 4 TAB | Refills: 0 | Status: SHIPPED | OUTPATIENT
Start: 2018-06-09 | End: 2018-06-11

## 2018-06-09 RX ORDER — EPINEPHRINE NASAL SOLUTION 1 MG/ML
SOLUTION NASAL
Status: COMPLETED
Start: 2018-06-09 | End: 2018-06-09

## 2018-06-09 RX ADMIN — Medication 1 APPLICATOR: at 06:30

## 2018-06-09 NOTE — ED NOTES
Chief Complaint   Patient presents with   • Epistaxis     Pt arrives with C/O nose bleed that started around 0400.  Assumed patient care. Pt assesement done.  Plan of care reviewed with patient.

## 2018-06-09 NOTE — ED PROVIDER NOTES
"ED Provider Note    CHIEF COMPLAINT  Chief Complaint   Patient presents with   • Epistaxis       HPI  Stephanie Guerrero is a 77 y.o. female who presents with epistaxis, left nostril, onset 2 hours previously.  Upon arrival, no active hemorrhage, patient holding her nose.  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  She denies trauma.  No fever.  Patient denies past problems with epistaxis.  The patient denies taking blood thinners.  No dizziness, no shortness of breath.  Patient is requesting low-dose medication to help with her anxiety, she states \"my daughter is on Xanax\".    REVIEW OF SYSTEMS  Constitutional: No fever  Respiratory: No shortness of breath  ENT epistaxis  Gastrointestinal: No abdominal pain  Musculoskeletal: No back pain  Psychiatric: Anxiety    PAST MEDICAL HISTORY  Past Medical History:   Diagnosis Date   • Anesthesia     bulbar polio L side of throat-\"no control of liquids on L side, choke easily\"   • Anesthesia     daughter-hx difficulty waking   • Asthma    • Breath shortness     asthma and \"cold weather\"   • Bronchitis 10/7/2014   • Bulbar polio     Affects left side of throat, dysphasia.  (age 16)   • Diabetes (CMS-HCC) (HCC)    • Elevated blood pressure 6/5/2015    Has been monitoring blood pressure at home, readings typically 97/70. 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 BP elevated in office today, she suspects this is secondary to office visit and some recent stress and will continue home monitoring. She limits sodium and is physically active.    • Essential hypertension    • Hiatus hernia syndrome    • Impacted ear wax 8/19/2013   • MEDICAL HOME 4/19/2013   • Sinusitis 10/7/2014   • White coat syndrome with diagnosis of hypertension        FAMILY HISTORY  Family History   Problem Relation Age of Onset   • Hypertension Mother    • Stroke Mother    • Heart Disease Father    • " Hypertension Father    • Diabetes Father    • Hypertension Brother    • Diabetes Brother        SOCIAL HISTORY  Social History     Social History   • Marital status:      Spouse name: N/A   • Number of children: 2   • Years of education: N/A     Occupational History   • retired- self employed Retired     Social History Main Topics   • Smoking status: Former Smoker     Packs/day: 0.25     Years: 5.00     Types: Cigarettes     Quit date: 1/1/1967   • Smokeless tobacco: Never Used      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   • Alcohol use 0.0 oz/week      Comment: occasional   • Drug use: No   • Sexual activity: Not Currently     Partners: Male     Birth control/ protection: Post-Menopausal     Other Topics Concern   • Not on file     Social History Narrative   • No narrative on file       SURGICAL HISTORY  Past Surgical History:   Procedure Laterality Date   • HYSTERECTOMY ROBOTIC XI  1/26/2017    Procedure: ROBOTIC XI BSO, BILATERAL URETEROLYSIS ;  Surgeon: Liborio Bearden M.D.;  Location: SURGERY [facility];  Service:    • ANTERIOR REPAIR  1/26/2017    Procedure: ANTERIOR REPAIR FOR ANTERIOR COLPORRHAPHY, POSTERIOR REPAIR;  Surgeon: Liborio Bearden M.D.;  Location: SURGERY [facility];  Service:    • CATARACT EXTRACTION WITH IOL      both eyes   • OTHER      urinary bladder prolapse   • TONSILLECTOMY         CURRENT MEDICATIONS  No current facility-administered medications on file prior to encounter.      Current Outpatient Prescriptions on File Prior to Encounter   Medication Sig Dispense Refill   • GIAN D ARCO PO Take 1 Each by mouth 3 times a day. Tincture     • multivitamin (THERAGRAN) Tab Take 1 Tab by mouth every day.     • Potassium 99 MG Tab Take 1 Tab by mouth every day.     • Chromium 1 MG Cap Take 1 Cap by mouth every day.     • L-GLUTAMINE PO Take 1 Tab by mouth every day.     • PROAIR  (90 BASE) MCG/ACT Aero Soln inhalation aerosol Inhale 2  "Puffs by mouth every 6 hours as needed for Shortness of Breath. 1 Inhaler 3   • Cholecalciferol (VITAMIN D-3 PO) Take 4,000 Units by mouth every day.     • Coenzyme Q10 (CO Q 10 PO) Take 1 Tab by mouth every day.     • Probiotic Product (PROBIOTIC PO) Take 1 Tab by mouth every day.     • Omega-3 Fatty Acids (OMEGA 3 PO) Take 1 Tab by mouth every day.     • Loratadine (CLARITIN PO) Take 1 Tab by mouth every day.         ALLERGIES  Allergies   Allergen Reactions   • Enalapril Rash      [id number]       PHYSICAL EXAM  VITAL SIGNS: Pulse 61   Temp 37.1 °C (98.7 °F)   Ht 1.549 m (5' 1\")   Wt 55.1 kg (121 lb 7.6 oz)   SpO2 91%   BMI 22.95 kg/m²   Constitutional:  Well nourished, No acute distress.   HENT: Excoriated nasal septum left nostril, small clot adherent.  Right nostril clear.  Posterior pharynx clear of active bleeding  Lymphatics: No submandibular adenopathy  Eyes:  Conjunctiva normal, No discharge.    Cardiovascular: The heart is regular rhythm, normal rate  Pulmonary: Lungs are clear.  Skin: No cyanosis.   Musculoskeletal: Neck nontender   Neurologic: speech is clear, no ataxia   Psychiatric: Anxious      Procedure note: Nasal cautery  Left nostril packed with cotton ball soaked in adrenaline and 2% lidocaine.  After 30 minutes, cottonball removed, silver nitrate sticks used to cauterize 2 small bleeding points on the left nasal septum.  Hemostasis was achieved.  Patient tolerated the procedure fairly well, slight irritation resolved within 1 minute      COURSE & MEDICAL DECISION MAKING  Pertinent Labs & Imaging studies reviewed. (See chart for details)  Patient had nasal cautery, currently is good hemostasis without active bleeding.  She was counseled as to strategies to help stop bleeding at home.  She is advised to return for severe bleeding, dizziness or any concerns.  Patient is advised we do not have the ENT on call at this hospital and if to worsen, to go to the [facility]    FINAL IMPRESSION     1. Epistaxis    2. Anxiety    3. Hypertension, unspecified type                 Electronically signed by: Jignesh Russell, 6/9/2018 2:19 PM

## 2018-06-09 NOTE — ED NOTES
Pt discharged with written instructions. Pt verbalized understanding. Epistaxis resolved at time of discharge. Pt ambulated independently from ER. Pt's AAOx4.

## 2018-06-09 NOTE — DISCHARGE INSTRUCTIONS
Hypertension  Hypertension is another name for high blood pressure. High blood pressure forces your heart to work harder to pump blood. A blood pressure reading has two numbers, which includes a higher number over a lower number (example: 110/72).  Follow these instructions at home:  · Have your blood pressure rechecked by your doctor.  · Only take medicine as told by your doctor. Follow the directions carefully. The medicine does not work as well if you skip doses. Skipping doses also puts you at risk for problems.  · Do not smoke.  · Monitor your blood pressure at home as told by your doctor.  Contact a doctor if:  · You think you are having a reaction to the medicine you are taking.  · You have repeat headaches or feel dizzy.  · You have puffiness (swelling) in your ankles.  · You have trouble with your vision.  Get help right away if:  · You get a very bad headache and are confused.  · You feel weak, numb, or faint.  · You get chest or belly (abdominal) pain.  · You throw up (vomit).  · You cannot breathe very well.  This information is not intended to replace advice given to you by your health care provider. Make sure you discuss any questions you have with your health care provider.  Document Released: 06/05/2009 Document Revised: 05/25/2017 Document Reviewed: 10/10/2014  [employer] Interactive Patient Education © 2017 [employer]      Nosebleed  Nosebleeds are common. A nosebleed can be caused by many things, including:  · Getting hit hard in the nose.  · Infections.  · Dryness in your nose.  · A dry climate.  · Medicines.  · Picking your nose.  · Your home heating and cooling systems.  HOME CARE   · Try controlling your nosebleed by pinching your nostrils gently. Do this for at least 10 minutes.  · Avoid blowing or sniffing your nose for a number of hours after having a nosebleed.  · Do not put gauze inside of your nose yourself. If your nose was packed by your doctor, try to keep the pack inside of your  nose until your doctor removes it.  ¨ If a gauze pack was used and it starts to fall out, gently replace it or cut off the end of it.  ¨ If a balloon catheter was used to pack your nose, do not cut or remove it unless told by your doctor.  · Avoid lying down while you are having a nosebleed. Sit up and lean forward.  · Use a nasal spray decongestant to help with a nosebleed as told by your doctor.  · Do not use petroleum jelly or mineral oil in your nose. These can drip into your lungs.  · Keep your house humid by using:  ¨ Less air conditioning.  ¨ A humidifier.  · Aspirin and blood thinners make bleeding more likely. If you are prescribed these medicines and you have nosebleeds, ask your doctor if you should stop taking the medicines or adjust the dose. Do not stop medicines unless told by your doctor.  · Resume your normal activities as you are able. Avoid straining, lifting, or bending at your waist for several days.  · If your nosebleed was caused by dryness in your nose, use over-the-counter saline nasal spray or gel. If you must use a lubricant:  ¨ Choose one that is water-soluble.  ¨ Use it only as needed.  ¨ Do not use it within several hours of lying down.  · Keep all follow-up visits as told by your doctor. This is important.  GET HELP IF:  · You have a fever.  · You get frequent nosebleeds.  · You are getting nosebleeds more often.  GET HELP RIGHT AWAY IF:  · Your nosebleed lasts longer than 20 minutes.  · Your nosebleed occurs after an injury to your face, and your nose looks crooked or broken.  · You have unusual bleeding from other parts of your body.  · You have unusual bruising on other parts of your body.  · You feel light-headed or dizzy.  · You become sweaty.  · You throw up (vomit) blood.  · You have a nosebleed after a head injury.  This information is not intended to replace advice given to you by your health care provider. Make sure you discuss any questions you have with your health care  provider.  Document Released: 09/26/2009 Document Revised: 01/08/2016 Document Reviewed: 08/03/2015  [employer] Interactive Patient Education © 2017 [employer]

## 2018-06-10 ENCOUNTER — PATIENT OUTREACH (OUTPATIENT)
Dept: HEALTH INFORMATION MANAGEMENT | Facility: OTHER | Age: 78
End: 2018-06-10

## 2018-06-10 NOTE — PROGRESS NOTES
Placed discharge outreach phone call to patient s/p ER discharge 6/9/18.  Left voicemail providing my contact information and instructions to call with any questions or concerns.

## 2018-07-18 ENCOUNTER — HOSPITAL ENCOUNTER (OUTPATIENT)
Dept: RADIOLOGY | Facility: MEDICAL CENTER | Age: 78
End: 2018-07-18
Attending: PHYSICIAN ASSISTANT
Payer: MEDICARE

## 2018-07-18 ENCOUNTER — OFFICE VISIT (OUTPATIENT)
Dept: URGENT CARE | Facility: PHYSICIAN GROUP | Age: 78
End: 2018-07-18
Payer: MEDICARE

## 2018-07-18 VITALS
HEIGHT: 63 IN | WEIGHT: 120 LBS | DIASTOLIC BLOOD PRESSURE: 78 MMHG | SYSTOLIC BLOOD PRESSURE: 118 MMHG | TEMPERATURE: 98.8 F | BODY MASS INDEX: 21.26 KG/M2 | RESPIRATION RATE: 16 BRPM | HEART RATE: 73 BPM | OXYGEN SATURATION: 93 %

## 2018-07-18 DIAGNOSIS — R05.9 COUGH: ICD-10-CM

## 2018-07-18 DIAGNOSIS — J18.9 PNEUMONIA OF RIGHT UPPER LOBE DUE TO INFECTIOUS ORGANISM: ICD-10-CM

## 2018-07-18 DIAGNOSIS — J98.01 BRONCHOSPASM: ICD-10-CM

## 2018-07-18 PROCEDURE — 99214 OFFICE O/P EST MOD 30 MIN: CPT | Performed by: PHYSICIAN ASSISTANT

## 2018-07-18 PROCEDURE — 71046 X-RAY EXAM CHEST 2 VIEWS: CPT

## 2018-07-18 RX ORDER — BENZONATATE 100 MG/1
100 CAPSULE ORAL 3 TIMES DAILY PRN
Qty: 60 CAP | Refills: 0 | Status: CANCELLED | OUTPATIENT
Start: 2018-07-18

## 2018-07-18 RX ORDER — AZITHROMYCIN 250 MG/1
TABLET, FILM COATED ORAL
Qty: 1 QUANTITY SUFFICIENT | Refills: 0 | Status: SHIPPED | OUTPATIENT
Start: 2018-07-18

## 2018-07-18 RX ORDER — IPRATROPIUM BROMIDE AND ALBUTEROL SULFATE 2.5; .5 MG/3ML; MG/3ML
3 SOLUTION RESPIRATORY (INHALATION) ONCE
Status: COMPLETED | OUTPATIENT
Start: 2018-07-18 | End: 2018-07-18

## 2018-07-18 RX ORDER — METHYLPREDNISOLONE 4 MG/1
TABLET ORAL
Qty: 1 KIT | Refills: 0 | Status: SHIPPED | OUTPATIENT
Start: 2018-07-18

## 2018-07-18 RX ADMIN — IPRATROPIUM BROMIDE AND ALBUTEROL SULFATE 3 ML: 2.5; .5 SOLUTION RESPIRATORY (INHALATION) at 15:00

## 2018-07-18 ASSESSMENT — ENCOUNTER SYMPTOMS
FEVER: 1
CHILLS: 0
WHEEZING: 1
SHORTNESS OF BREATH: 0
PALPITATIONS: 0
ABDOMINAL PAIN: 0
SPUTUM PRODUCTION: 1
SORE THROAT: 0
NAUSEA: 0
COUGH: 1
EYE DISCHARGE: 0
DIARRHEA: 0
VOMITING: 0
EYE REDNESS: 0
HEADACHES: 0

## 2018-07-18 NOTE — PROGRESS NOTES
"Subjective:   Stephanie Guerrero is a 78 y.o. female who presents for Fever (Chest congestion. onset over 1 week ago. )        Fever    This is a new problem. Episode onset: 10d. The problem occurs intermittently. The problem has been waxing and waning. Maximum temperature: subj fever. Associated symptoms include congestion, coughing and wheezing. 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 Treatments tried: jeffers seal, paud'arca, echinacea. The treatment provided mild relief.     PMH of asthma, remote PMH of bronchitis, denies pMH of pneumonia, does have seasonal allerg - having desensitiztion shots and antihistamine, nasal spray. c/o some wheeze, denies much prod cough, more dry.     Review of Systems   Constitutional: Positive for fever ( subj). Negative for chills.   HENT: Positive for congestion. 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    Eyes: Negative for discharge and redness.   Respiratory: Positive for cough, sputum production and wheezing. Negative for shortness of breath.    Cardiovascular: Negative for chest pain, palpitations and leg swelling.   Gastrointestinal: Negative for abdominal pain, diarrhea, nausea and vomiting.   Genitourinary: Negative for dysuria.   Skin: Negative for rash.   Neurological: Negative for headaches.     Allergies   Allergen Reactions   • Enalapril Rash      [id number]   I have worn a mask for the entire encounter with this patient.    Objective:   /78   Pulse 73   Temp 37.1 °C (98.8 °F)   Resp 16   Ht 1.6 m (5' 3\")   Wt 54.4 kg (120 lb)   SpO2 93%   BMI 21.26 kg/m²   Physical Exam   Constitutional: She is oriented to person, place, and time. She appears well-developed and well-nourished. No distress.   HENT:   Head: Normocephalic " and atraumatic.   Right Ear: Tympanic membrane, external ear and ear canal normal.   Left Ear: Tympanic membrane, external ear and ear canal normal.   Nose: Nose normal.   Mouth/Throat: Uvula is midline and mucous membranes are normal. Posterior oropharyngeal erythema ( mild PND) present. No oropharyngeal exudate, posterior oropharyngeal edema or tonsillar abscesses.   Eyes: Conjunctivae and lids are normal. Right eye exhibits no discharge. Left eye exhibits no discharge. No scleral icterus.   Neck: Neck supple.   Pulmonary/Chest: Effort normal. No accessory muscle usage. No respiratory distress. She has no decreased breath sounds. She has wheezes. She has rhonchi. She has no rales.   Musculoskeletal: Normal range of motion.   Lymphadenopathy:     She has cervical adenopathy ( mild bilat).   Neurological: She is alert and oriented to person, place, and time. She is not disoriented.   Skin: Skin is warm and dry. She is not diaphoretic. No erythema. No pallor.   Psychiatric: Her speech is normal and behavior is normal.   Nursing note and vitals reviewed.    CXR - Impression       Minimal right upper lobe opacity which may represent minimal right upper lobe pneumonia.   Reading Provider Reading Date   Eusebio Raman M.D. Jul 18, 2018   Signing Provider Signing Date Signing Time   Eusebio Raman M.D. Jul 18, 2018  3:15 PM       Assessment/Plan:   Assessment    1. Pneumonia of right upper lobe due to infectious organism (HCC)  - azithromycin (ZITHROMAX) 250 MG Tab; Take as directed on package. Dispense one package.  Dispense: 1 Quantity Sufficient; Refill: 0    2. Bronchospasm  - ipratropium-albuterol (DUONEB) nebulizer solution; 3 mL by Nebulization route Once.  - MethylPREDNISolone (MEDROL DOSEPAK) 4 MG Tablet Therapy Pack; Take as directed on package.  Dispense one package.  Dispense: 1 Kit; Refill: 0    3. Cough  - DX-CHEST-2 VIEWS; Future    Other orders  - ECHINACEA-SOTO SEAL PO; Take  by mouth.  - GIAN GLYNN  ARCO PO; Take  by mouth.    Supportive care is reviewed with patient/caregiver - recommend to push PO fluids and electrolytes, tylenol, netti pot/saline irrig, humidifier in home, flonase, ponaris, antihistamine,  take full course of Rx, take with probiotics, observe for resolution  Return to clinic with lack of resolution or progression of symptoms.    Cautioned regarding potential side effects of steroid, avoid nsaids while using    Differential diagnosis, natural history, supportive care, and indications for immediate follow-up discussed.

## 2018-10-11 ENCOUNTER — PATIENT OUTREACH (OUTPATIENT)
Dept: HEALTH INFORMATION MANAGEMENT | Facility: OTHER | Age: 78
End: 2018-10-11

## 2018-10-11 NOTE — PROGRESS NOTES
Outcome: Left Message    Please transfer to Patient Outreach Team at [phone] when patient returns call.    WebIZ Checked & Epic Updated:  yes    [facility] Verified: yes    Attempt # 1

## 2018-10-11 NOTE — PROGRESS NOTES
Outcome: Decline - don't any value  Pt moved to [facility]. Tried to provide info about her care gaps and stated she is interested     Attempt: 2

## 2021-01-14 DIAGNOSIS — Z23 NEED FOR VACCINATION: ICD-10-CM

## 2022-07-01 NOTE — PROGRESS NOTES
Troponin 0.08. Patient denies any chest pain or chest pressure. BS 1471/87, HR 82/ RR18/ O2 97% @12L mist mask.  Will update to hospitalist.    N/A

## 2024-08-29 NOTE — PROGRESS NOTES
"HD# 2 s/p Robotic BSO and AP repair.   Patient feels much better/ No sob/ Bringing up phlem/ No fevers/ No chills.       Blood pressure 137/85, pulse 69, temperature 36.2 °C (97.2 °F), resp. rate 17, height 1.575 m (5' 2\"), weight 57.607 kg (127 lb), SpO2 96 %, not currently breastfeeding.     .CARDIOVASCULAR:  S1, S2 plus 0.  PULMONARY:  No wheezes or rales.  Some crackles on the right lower bases.  ABDOMEN:  Soft and nontender with no distension.  Well-healed incision signs.     The dressings were removed which were clean, dry and intact.  MUSCULOSKELETAL:  The patient has no lower extremity edema.  No evidence of    any DVT.  SKIN:  Warm and dry with no rash.      Current Facility-Administered Medications   Medication Dose   • NS infusion     • famotidine (PEPCID) injection 20 mg  20 mg   • famotidine (PEPCID) tablet 20 mg  20 mg   • ondansetron (ZOFRAN) syringe/vial injection 4 mg  4 mg   • NS infusion 500 mL  500 mL   • Respiratory Care per Protocol     • lorazepam (ATIVAN) tablet 0.25 mg  0.25 mg   • hydrocodone-acetaminophen 2.5-108 mg/5mL (HYCET) solution 15 mL  15 mL    Or   • hydrocodone-acetaminophen 2.5-108 mg/5mL (HYCET) solution 30 mL  30 mL   • cefTRIAXone (ROCEPHIN) 2 g in  mL IVPB  2 g   • albuterol inhaler 2 Puff  2 Puff   • multivitamin (THERAGRAN) tablet 1 Tab  1 Tab   • montelukast (SINGULAIR) tablet 10 mg  10 mg   • naproxen (NAPROSYN) tablet 250 mg  250 mg   • oxycodone-acetaminophen (PERCOCET) 5-325 MG per tablet 1 Tab  1 Tab   • metoclopramide (REGLAN) tablet 10 mg  10 mg   • vitamin D (cholecalciferol) tablet 4,000 Units  4,000 Units   • coenzyme Q-10 capsule 30 mg  30 mg   • loratadine (CLARITIN) tablet 5 mg  5 mg   • fish oil capsule 1,000 mg  1,000 mg       Recent Labs      01/27/17   0450  01/28/17   0310   WBC  17.8*  13.7*   RBC  3.52*  3.33*   HEMOGLOBIN  10.9*  10.0*   HEMATOCRIT  32.8*  30.9*   MCV  93.2  92.8   MCH  31.0  30.0   MCHC  33.2*  32.4*   RDW  45.3  43.9 " ----- Message from Ruthy Medina sent at 8/29/2024 10:26 AM CDT -----  [phone] pt mom would like to schedule referral in system     PLATELETCT  214  180   MPV  9.8  9.8     Recent Labs      01/27/17   0450  01/28/17   0310   NEUTSPOLYS  80.20*  81.30*   LYMPHOCYTES  7.20*  4.80*   MONOCYTES  12.10  13.40   EOSINOPHILS  0.10  0.10   BASOPHILS  0.20  0.10     Recent Labs      01/26/17   0705  01/27/17   0450  01/28/17   0310   SODIUM  139  121*  121*   POTASSIUM  4.1  3.8  3.8   CHLORIDE  101  87*  88*   CO2  25  23  28   GLUCOSE  64*  130*  142*   BUN  14  14  5*   CREATININE  0.70  0.62  0.46*   CALCIUM  9.8  8.2*  7.6*         Intake/Output Summary (Last 24 hours) at 01/28/17 1348  Last data filed at 01/28/17 1100   Gross per 24 hour   Intake 1537.5 ml   Output   4700 ml   Net -3162.5 ml     A/P  1) Shortness of breath.  Chest x-ray no aspiraton.  Continue with ceftriaxone for    Continue O2 per protocol and wean off O2.     Monitor temperature curve.  Monitor CBC.  2)  Leukocytosis.  Improving, Repeat in a.m. antibiotics    for aspiration pneumonia.  3.  Hyponatremia.  Replete with sodium.  Repeat lytes in a.m    Antwon Ling

## (undated) DEVICE — TUBING CLEARLINK DUO-VENT - C-FLO (48EA/CA)

## (undated) DEVICE — SET LEADWIRE 5 LEAD BEDSIDE DISPOSABLE ECG (1SET OF 5/EA)

## (undated) DEVICE — SEAL CANNULA STAPLER 12 MM (10EA/BX)

## (undated) DEVICE — GUIDE TRACHE TUBE INTUBATING STYLET 5.0-10.0MM 14FR (20EA/PK)

## (undated) DEVICE — SUTURE 2-0 VICRYL PLUS CT-2 - 27 INCH (36/BX)

## (undated) DEVICE — PAD OR TABLE DA VINCI 2IN X 20IN X 72IN - (12EA/CA)

## (undated) DEVICE — DRAPE MAYO STAND - (30/CA)

## (undated) DEVICE — NEEDLE INSUFFLATION FOR STEP - (12/BX)

## (undated) DEVICE — KIT ROOM DECONTAMINATION

## (undated) DEVICE — GOWN SURGEONS X-LARGE - DISP. (30/CA)

## (undated) DEVICE — SUCTION INSTRUMENT YANKAUER BULBOUS TIP W/O VENT (50EA/CA)

## (undated) DEVICE — PAD BABY LAP 4X18 W/O - RINGS PREWASHED 5/PK 40PK/CS

## (undated) DEVICE — TUBE NG SALEM SUMP 16FR (50EA/CA)

## (undated) DEVICE — FORCEPS PROGRASP DA VINCI 10X'S REUSABLE

## (undated) DEVICE — SET EXTENSION WITH 2 PORTS (48EA/CA) ***PART #2C8610 IS A SUBSTITUTE*****

## (undated) DEVICE — SEAL 5MM-8MM UNIVERSAL  BOX OF 10

## (undated) DEVICE — BLADE SURGICAL CLIPPER - (50EA/CA)

## (undated) DEVICE — SUTURE 3-0 MONOCRYL PLUS PS-1 - 27 INCH (36/BX)

## (undated) DEVICE — ELECTRODE DUAL RETURN W/ CORD - (50/PK)

## (undated) DEVICE — SUCTION INSTRUMENT YANKAUER OPEN TIP W/O VENT (50EA/CA)

## (undated) DEVICE — TUBE E-T HI-LO CUFF 7.0MM (10EA/PK)

## (undated) DEVICE — TUBING LAPAROSCOPIC PLUME DEVICE (10EA/CA)

## (undated) DEVICE — ELECTRODE 850 FOAM ADHESIVE - HYDROGEL RADIOTRNSPRNT (50/PK)

## (undated) DEVICE — REDUCER XI STAPLER 12MM TO 8MM (6EA/BX)

## (undated) DEVICE — GLOVE BIOGEL INDICATOR SZ 8 SURGICAL PF LTX - (50/BX 4BX/CA)

## (undated) DEVICE — BLADE SURGICAL #10 - (50/BX)

## (undated) DEVICE — SUTURE GENERAL

## (undated) DEVICE — SET SUCTION/IRRIGATION WITH DISPOSABLE TIP (6/CA )PART #0250-070-520 IS A SUB

## (undated) DEVICE — GLOVE BIOGEL SZ 6.5 SURGICAL PF LTX (50PR/BX 4BX/CA)

## (undated) DEVICE — WATER IRRIGATION STERILE 1000ML (12EA/CA)

## (undated) DEVICE — SPATULA PERMANENT CAUTERY DA VINCI 10X'S REUSABLE

## (undated) DEVICE — SLEEVE, VASO, THIGH, MED

## (undated) DEVICE — SPONGE XRAY 8X4 STERL. 12PL - (10EA/TY 80TY/CA)

## (undated) DEVICE — NEEDLE DRIVER MEGA SUTURECUT DA VINCI 15X'S REUSABLE

## (undated) DEVICE — GOWN WARMING STANDARD FLEX - (30/CA)

## (undated) DEVICE — TUBE CONNECT SUCTION CLEAR 120 X 1/4" (50EA/CA)"

## (undated) DEVICE — CANISTER SUCTION 3000ML MECHANICAL FILTER AUTO SHUTOFF MEDI-VAC NONSTERILE LF DISP  (40EA/CA)

## (undated) DEVICE — TROCAR Z THREAD 12 X 100 - BLADED (6/BX)

## (undated) DEVICE — SENSOR SPO2 NEO LNCS ADHESIVE (20/BX) SEE USER NOTES

## (undated) DEVICE — SUTURE 3-0 VICRYL PLUS SH - 27 INCH (36/BX)

## (undated) DEVICE — FORCEPS MARYLAND BIPOLAR DA VINCI 10X'S REUSABLE

## (undated) DEVICE — ARMREST CRADLE FOAM - (2PR/PK 12PR/CA)

## (undated) DEVICE — KIT ANESTHESIA W/CIRCUIT & 3/LT BAG W/FILTER (20EA/CA)

## (undated) DEVICE — LACTATED RINGERS INJ 1000 ML - (14EA/CA 60CA/PF)

## (undated) DEVICE — SUTURE QUILL 0 PDO 14X14 - (12/BX)

## (undated) DEVICE — DRAPE ARM  BOX OF 20

## (undated) DEVICE — GLOVE BIOGEL SZ 8 SURGICAL PF LTX - (50PR/BX 4BX/CA)

## (undated) DEVICE — BAG RETRIEVAL 12/15 MM INZII (5EA/CA) THIS WILL REPLACE ITEM 75018

## (undated) DEVICE — GLOVE BIOGEL PI INDICATOR SZ 6.5 SURGICAL PF LF - (50/BX 4BX/CA)

## (undated) DEVICE — SUTURE 0 VICRYL PLUS CT-2 - 27 INCH (36/BX)

## (undated) DEVICE — SOLUTION NORMOSOL-4 INJ 1000ML

## (undated) DEVICE — PENCIL ELECTSURG 10FT BTN SWH - (50/CA)

## (undated) DEVICE — DRAPE COLUMN  BOX OF 20

## (undated) DEVICE — ROBOTIC SURGERY SERVICES

## (undated) DEVICE — MASK ANESTHESIA ADULT  - (100/CA)

## (undated) DEVICE — PACK GYN DAVINCI (2EA/CA)

## (undated) DEVICE — WATER IRRIG. STER 3000 ML - (4/CA)

## (undated) DEVICE — SUTURE 2-0 MONOCRYL SH

## (undated) DEVICE — GLOVE BIOGEL PI ORTHO SZ 7 PF LF (40PR/BX)

## (undated) DEVICE — SYRINGE ASEPTO - (50EA/CA

## (undated) DEVICE — NEEDLE DRIVER LARGE DA VINCI 10X'S REUSABLE